# Patient Record
Sex: FEMALE | Race: BLACK OR AFRICAN AMERICAN | NOT HISPANIC OR LATINO | Employment: FULL TIME | URBAN - METROPOLITAN AREA
[De-identification: names, ages, dates, MRNs, and addresses within clinical notes are randomized per-mention and may not be internally consistent; named-entity substitution may affect disease eponyms.]

---

## 2017-02-18 ENCOUNTER — HOSPITAL ENCOUNTER (INPATIENT)
Facility: HOSPITAL | Age: 42
LOS: 1 days | Discharge: HOME | DRG: 638 | End: 2017-02-19
Attending: EMERGENCY MEDICINE | Admitting: INTERNAL MEDICINE
Payer: MEDICAID

## 2017-02-18 DIAGNOSIS — R53.1 WEAKNESS: ICD-10-CM

## 2017-02-18 DIAGNOSIS — N39.0 URINARY TRACT INFECTION WITHOUT HEMATURIA, SITE UNSPECIFIED: ICD-10-CM

## 2017-02-18 DIAGNOSIS — R73.9 HYPERGLYCEMIA: Primary | ICD-10-CM

## 2017-02-18 DIAGNOSIS — N17.9 ACUTE RENAL FAILURE, UNSPECIFIED ACUTE RENAL FAILURE TYPE: ICD-10-CM

## 2017-02-18 PROBLEM — R42 DIZZINESS: Status: ACTIVE | Noted: 2017-02-18

## 2017-02-18 PROBLEM — R11.2 NAUSEA WITH VOMITING: Status: ACTIVE | Noted: 2017-02-18

## 2017-02-18 PROBLEM — E66.9 OBESITY (BMI 30-39.9): Status: ACTIVE | Noted: 2017-02-18

## 2017-02-18 LAB
ALBUMIN SERPL BCP-MCNC: 3.9 G/DL
ALP SERPL-CCNC: 113 U/L
ALT SERPL W/O P-5'-P-CCNC: 15 U/L
ANION GAP SERPL CALC-SCNC: 14 MMOL/L
AST SERPL-CCNC: 14 U/L
B-HCG UR QL: NEGATIVE
B-OH-BUTYR BLD STRIP-SCNC: 0.2 MMOL/L
BACTERIA #/AREA URNS HPF: ABNORMAL /HPF
BASOPHILS # BLD AUTO: 0.05 K/UL
BASOPHILS NFR BLD: 0.4 %
BILIRUB SERPL-MCNC: 0.8 MG/DL
BILIRUB UR QL STRIP: NEGATIVE
BUN SERPL-MCNC: 20 MG/DL
CALCIUM SERPL-MCNC: 10.2 MG/DL
CHLORIDE SERPL-SCNC: 98 MMOL/L
CLARITY UR: ABNORMAL
CO2 SERPL-SCNC: 24 MMOL/L
COLOR UR: YELLOW
CREAT SERPL-MCNC: 2.4 MG/DL
CTP QC/QA: YES
DIFFERENTIAL METHOD: ABNORMAL
EOSINOPHIL # BLD AUTO: 0 K/UL
EOSINOPHIL NFR BLD: 0.3 %
ERYTHROCYTE [DISTWIDTH] IN BLOOD BY AUTOMATED COUNT: 13.1 %
EST. GFR  (AFRICAN AMERICAN): 28 ML/MIN/1.73 M^2
EST. GFR  (NON AFRICAN AMERICAN): 24 ML/MIN/1.73 M^2
GLUCOSE SERPL-MCNC: 443 MG/DL
GLUCOSE UR QL STRIP: ABNORMAL
HCT VFR BLD AUTO: 43.8 %
HGB BLD-MCNC: 15 G/DL
HGB UR QL STRIP: NEGATIVE
HYALINE CASTS #/AREA URNS LPF: 10 /LPF
KETONES UR QL STRIP: NEGATIVE
LEUKOCYTE ESTERASE UR QL STRIP: ABNORMAL
LYMPHOCYTES # BLD AUTO: 2.7 K/UL
LYMPHOCYTES NFR BLD: 24.1 %
MCH RBC QN AUTO: 28.8 PG
MCHC RBC AUTO-ENTMCNC: 34.2 %
MCV RBC AUTO: 84 FL
MICROSCOPIC COMMENT: ABNORMAL
MONOCYTES # BLD AUTO: 0.6 K/UL
MONOCYTES NFR BLD: 5.2 %
NEUTROPHILS # BLD AUTO: 7.8 K/UL
NEUTROPHILS NFR BLD: 69.7 %
NITRITE UR QL STRIP: NEGATIVE
PH UR STRIP: 5 [PH] (ref 5–8)
PLATELET # BLD AUTO: 316 K/UL
PMV BLD AUTO: 10.8 FL
POCT GLUCOSE: 195 MG/DL (ref 70–110)
POCT GLUCOSE: 236 MG/DL (ref 70–110)
POCT GLUCOSE: 291 MG/DL (ref 70–110)
POCT GLUCOSE: 324 MG/DL (ref 70–110)
POCT GLUCOSE: 327 MG/DL (ref 70–110)
POCT GLUCOSE: 377 MG/DL (ref 70–110)
POCT GLUCOSE: 384 MG/DL (ref 70–110)
POTASSIUM SERPL-SCNC: 3.9 MMOL/L
PROT SERPL-MCNC: 8.7 G/DL
PROT UR QL STRIP: NEGATIVE
RBC # BLD AUTO: 5.21 M/UL
RBC #/AREA URNS HPF: 1 /HPF (ref 0–4)
SODIUM SERPL-SCNC: 136 MMOL/L
SP GR UR STRIP: 1.01 (ref 1–1.03)
SQUAMOUS #/AREA URNS HPF: 15 /HPF
URN SPEC COLLECT METH UR: ABNORMAL
UROBILINOGEN UR STRIP-ACNC: NEGATIVE EU/DL
WBC # BLD AUTO: 11.24 K/UL
WBC #/AREA URNS HPF: 15 /HPF (ref 0–5)
YEAST URNS QL MICRO: ABNORMAL

## 2017-02-18 PROCEDURE — 81000 URINALYSIS NONAUTO W/SCOPE: CPT

## 2017-02-18 PROCEDURE — 82010 KETONE BODYS QUAN: CPT

## 2017-02-18 PROCEDURE — 11000001 HC ACUTE MED/SURG PRIVATE ROOM

## 2017-02-18 PROCEDURE — 85025 COMPLETE CBC W/AUTO DIFF WBC: CPT

## 2017-02-18 PROCEDURE — 63600175 PHARM REV CODE 636 W HCPCS: Performed by: EMERGENCY MEDICINE

## 2017-02-18 PROCEDURE — 82962 GLUCOSE BLOOD TEST: CPT

## 2017-02-18 PROCEDURE — 81025 URINE PREGNANCY TEST: CPT | Performed by: EMERGENCY MEDICINE

## 2017-02-18 PROCEDURE — 99285 EMERGENCY DEPT VISIT HI MDM: CPT

## 2017-02-18 PROCEDURE — 87086 URINE CULTURE/COLONY COUNT: CPT

## 2017-02-18 PROCEDURE — 96372 THER/PROPH/DIAG INJ SC/IM: CPT

## 2017-02-18 PROCEDURE — 25000003 PHARM REV CODE 250: Performed by: EMERGENCY MEDICINE

## 2017-02-18 PROCEDURE — 96367 TX/PROPH/DG ADDL SEQ IV INF: CPT

## 2017-02-18 PROCEDURE — 96365 THER/PROPH/DIAG IV INF INIT: CPT

## 2017-02-18 PROCEDURE — 83036 HEMOGLOBIN GLYCOSYLATED A1C: CPT

## 2017-02-18 PROCEDURE — 36415 COLL VENOUS BLD VENIPUNCTURE: CPT

## 2017-02-18 PROCEDURE — 25000003 PHARM REV CODE 250: Performed by: INTERNAL MEDICINE

## 2017-02-18 PROCEDURE — 80053 COMPREHEN METABOLIC PANEL: CPT

## 2017-02-18 RX ORDER — SODIUM CHLORIDE 9 MG/ML
INJECTION, SOLUTION INTRAVENOUS CONTINUOUS
Status: DISCONTINUED | OUTPATIENT
Start: 2017-02-18 | End: 2017-02-19 | Stop reason: HOSPADM

## 2017-02-18 RX ORDER — INSULIN ASPART 100 [IU]/ML
10 INJECTION, SOLUTION INTRAVENOUS; SUBCUTANEOUS
Status: COMPLETED | OUTPATIENT
Start: 2017-02-18 | End: 2017-02-18

## 2017-02-18 RX ORDER — SODIUM CHLORIDE 9 MG/ML
1000 INJECTION, SOLUTION INTRAVENOUS ONCE
Status: DISCONTINUED | OUTPATIENT
Start: 2017-02-18 | End: 2017-02-19 | Stop reason: HOSPADM

## 2017-02-18 RX ORDER — ACETAMINOPHEN 325 MG/1
650 TABLET ORAL EVERY 6 HOURS PRN
Status: DISCONTINUED | OUTPATIENT
Start: 2017-02-18 | End: 2017-02-19 | Stop reason: HOSPADM

## 2017-02-18 RX ORDER — AMOXICILLIN 250 MG
1 CAPSULE ORAL 2 TIMES DAILY
Status: DISCONTINUED | OUTPATIENT
Start: 2017-02-18 | End: 2017-02-19 | Stop reason: HOSPADM

## 2017-02-18 RX ORDER — ONDANSETRON 2 MG/ML
4 INJECTION INTRAMUSCULAR; INTRAVENOUS EVERY 12 HOURS PRN
Status: DISCONTINUED | OUTPATIENT
Start: 2017-02-18 | End: 2017-02-19 | Stop reason: HOSPADM

## 2017-02-18 RX ORDER — INSULIN GLARGINE 100 [IU]/ML
INJECTION, SOLUTION SUBCUTANEOUS NIGHTLY
Status: ON HOLD | COMMUNITY
End: 2020-11-16 | Stop reason: HOSPADM

## 2017-02-18 RX ORDER — INSULIN ASPART 100 [IU]/ML
1-12 INJECTION, SOLUTION INTRAVENOUS; SUBCUTANEOUS
Status: DISCONTINUED | OUTPATIENT
Start: 2017-02-18 | End: 2017-02-19 | Stop reason: HOSPADM

## 2017-02-18 RX ORDER — HEPARIN SODIUM 5000 [USP'U]/ML
5000 INJECTION, SOLUTION INTRAVENOUS; SUBCUTANEOUS EVERY 8 HOURS
Status: DISCONTINUED | OUTPATIENT
Start: 2017-02-18 | End: 2017-02-19 | Stop reason: HOSPADM

## 2017-02-18 RX ORDER — INSULIN ASPART 100 [IU]/ML
1-12 INJECTION, SOLUTION INTRAVENOUS; SUBCUTANEOUS
Status: DISCONTINUED | OUTPATIENT
Start: 2017-02-18 | End: 2017-02-18

## 2017-02-18 RX ORDER — GLUCAGON 1 MG
1 KIT INJECTION
Status: DISCONTINUED | OUTPATIENT
Start: 2017-02-18 | End: 2017-02-19 | Stop reason: HOSPADM

## 2017-02-18 RX ADMIN — INSULIN ASPART 2 UNITS: 100 INJECTION, SOLUTION INTRAVENOUS; SUBCUTANEOUS at 12:02

## 2017-02-18 RX ADMIN — INSULIN ASPART 10 UNITS: 100 INJECTION, SOLUTION INTRAVENOUS; SUBCUTANEOUS at 04:02

## 2017-02-18 RX ADMIN — CEFTRIAXONE 1 G: 1 INJECTION, SOLUTION INTRAVENOUS at 05:02

## 2017-02-18 RX ADMIN — SODIUM CHLORIDE 1000 ML: 0.9 INJECTION, SOLUTION INTRAVENOUS at 01:02

## 2017-02-18 RX ADMIN — INSULIN ASPART 4 UNITS: 100 INJECTION, SOLUTION INTRAVENOUS; SUBCUTANEOUS at 08:02

## 2017-02-18 RX ADMIN — ONDANSETRON 4 MG: 2 INJECTION INTRAMUSCULAR; INTRAVENOUS at 10:02

## 2017-02-18 RX ADMIN — PROMETHAZINE HYDROCHLORIDE 25 MG: 25 INJECTION, SOLUTION INTRAMUSCULAR; INTRAVENOUS at 01:02

## 2017-02-18 RX ADMIN — INSULIN ASPART 6 UNITS: 100 INJECTION, SOLUTION INTRAVENOUS; SUBCUTANEOUS at 05:02

## 2017-02-18 RX ADMIN — INSULIN ASPART 10 UNITS: 100 INJECTION, SOLUTION INTRAVENOUS; SUBCUTANEOUS at 09:02

## 2017-02-18 RX ADMIN — HEPARIN SODIUM 5000 UNITS: 5000 INJECTION, SOLUTION INTRAVENOUS; SUBCUTANEOUS at 02:02

## 2017-02-18 RX ADMIN — HEPARIN SODIUM 5000 UNITS: 5000 INJECTION, SOLUTION INTRAVENOUS; SUBCUTANEOUS at 09:02

## 2017-02-18 RX ADMIN — SODIUM CHLORIDE: 0.9 INJECTION, SOLUTION INTRAVENOUS at 08:02

## 2017-02-18 RX ADMIN — DOCUSATE SODIUM AND SENNOSIDES 1 TABLET: 8.6; 5 TABLET, FILM COATED ORAL at 08:02

## 2017-02-18 NOTE — ASSESSMENT & PLAN NOTE
In this patient with insulin requiring DM II. The patient was found to have a UTI likely as culprit. Will continue home insulin regimen and sliding scale.

## 2017-02-18 NOTE — ASSESSMENT & PLAN NOTE
Ceftriaxone for now. Likely culprit for N/V, hyperglycemia with acute renal failure. No sepsis.

## 2017-02-18 NOTE — IP AVS SNAPSHOT
[place] [address] [place] [address]  Phone: [phone]           Patient Discharge Instructions     Our goal is to set you up for success. This packet includes information on your condition, medications, and your home care. It will help you to care for yourself so you don't get sicker and need to go back to the hospital.     Please ask your nurse if you have any questions.        There are many details to remember when preparing to leave the hospital. Here is what you will need to do:    1. Take your medicine. If you are prescribed medications, review your Medication List in the following pages. You may have new medications to  at the pharmacy and others that you'll need to stop taking. Review the instructions for how and when to take your medications. Talk with your doctor or nurses if you are unsure of what to do.     2. Go to your follow-up appointments. Specific follow-up information is listed in the following pages. Your may be contacted by a transition nurse or clinical provider about future appointments. Be sure we have all of the phone numbers to reach you, if needed. Please contact your provider's office if you are unable to make an appointment.     3. Watch for warning signs. Your doctor or nurse will give you detailed warning signs to watch for and when to call for assistance. These instructions may also include educational information about your condition. If you experience any of warning signs to your health, call your doctor.               [facility]  Unless otherwise directed by your provider, please contact [facility], our nurse care line that is available for 24/7 assistance.     [phone] (toll-free)    Registered nurses in the [facility] provide clinical advisement, health education, appointment booking, and other advisory services.                    ** Verify the list of medication(s) below is accurate and up to date.  Carry this with you in case of emergency. If your medications have changed, please notify your healthcare provider.             Medication List      START taking these medications        Additional Info                      ciprofloxacin HCl 500 MG tablet   Commonly known as:  CIPRO   Quantity:  10 tablet   Refills:  0   Dose:  500 mg    Instructions:  Take 1 tablet (500 mg total) by mouth every 12 (twelve) hours.     Begin Date    AM    Noon    PM    Bedtime         CONTINUE taking these medications        Additional Info                      insulin glargine 100 unit/mL injection   Commonly known as:  LANTUS   Refills:  0    Instructions:  Inject into the skin every evening.     Begin Date    AM    Noon    PM    Bedtime       insulin NPH-insulin regular (70/30) 100 unit/mL (70-30) injection   Commonly known as:  NOVOLIN 70/30   Refills:  0    Instructions:  Inject into the skin 2 (two) times daily.     Begin Date    AM    Noon    PM    Bedtime       insulin regular 100 unit/mL injection   Commonly known as:  Humulin R   Quantity:  3 mL   Refills:  1   Dose:  1 Units   Comments:  As per Sliding Scale instructions    Instructions:  Inject 1 Units into the skin 3 (three) times daily before meals.     Begin Date    AM    Noon    PM    Bedtime       promethazine 25 MG tablet   Commonly known as:  PHENERGAN   Quantity:  15 tablet   Refills:  0   Dose:  25 mg    Instructions:  Take 1 tablet (25 mg total) by mouth every 6 (six) hours as needed for Nausea.     Begin Date    AM    Noon    PM    Bedtime            Where to Get Your Medications      You can get these medications from any pharmacy     Bring a paper prescription for each of these medications     ciprofloxacin HCl 500 MG tablet                  Please bring to all follow up appointments:    1. A copy of your discharge instructions.  2. All medicines you are currently taking in their original bottles.  3. Identification and insurance card.    Please arrive 15  "minutes ahead of scheduled appointment time.    Please call 24 hours in advance if you must reschedule your appointment and/or time.        Follow-up Information     Follow up with Primary Doctor No In 1 week.          Primary Diagnosis     Your primary diagnosis was:  Elevated Blood Sugar Level      Admission Information     Date & Time Provider Department CSN    2/18/2017 12:50 AM Agusto Colon MD [facility] [id number]      Care Providers     Provider Role Specialty Primary office phone    Agusto Colon MD Attending Provider Hospitalist [phone]      Your Vitals Were     BP Pulse Temp Resp Height Weight    133/80 83 98.3 °F (36.8 °C) (Oral) 18 5' 7" (1.702 m) 113.4 kg (250 lb)    SpO2 BMI             97% 39.16 kg/m2         Recent Lab Values     No lab values to display.      Pending Labs     Order Current Status    Hemoglobin A1c In process    Urine culture **CANNOT BE ORDERED STAT** Preliminary result      Allergies as of 2/19/2017     No Known Allergies      Advance Directives     An advance directive is a document which, in the event you are no longer able to make decisions for yourself, tells your healthcare team what kind of treatment you do or do not want to receive, or who you would like to make those decisions for you.  If you do not currently have an advance directive, [facility] encourages you to create one.  For more information call:  [phone]), [phone] ([phone]),  or log on to [email or web address].        Smoking Cessation     If you would like to quit smoking:   You may be eligible for free services if you are a [place] resident and started smoking cigarettes before September 1, 1988.  Call the [employer]oking [employer]essation [employer] ([employer]) toll free at (253) 091-8632 or (847) 558-3492.   Call 6-800QUIT-NOW if you do not meet the above criteria.            Language Assistance Services     ATTENTION: Language assistance services are available, free " of charge. Please call [phone].      ATENCIÓN: Si drake keenan, tiene a mcgovern disposición servicios gratuitos de asistencia lingüística. Llame al [phone].     CHÚ Ý: N?u b?n nói Ti?ng Vi?t, có các d?ch v? h? tr? ngôn ng? mi?n phí dành cho b?n. G?i s? [phone].        Diabetes Discharge Instructions                                   [facility] Sign-Up     Activating your [facility] account is as easy as 1-2-3!     1) Visit [email or web address], select Sign Up Now, enter this activation code and your date of birth, then select Next.  [email or web address]  Expires: 4/5/2017 11:32 AM      2) Create a username and password to use when you visit [facility] in the future and select a security question in case you lose your password and select Next.    3) Enter your e-mail address and click Sign Up!    Additional Information  If you have questions, please e-mail [email or web address] or call [phone] to talk to our [facility] staff. Remember, [facility] is NOT to be used for urgent needs. For medical emergencies, dial 911.          [facility] complies with applicable Federal civil rights laws and does not discriminate on the basis of race, color, national origin, age, disability, or sex.

## 2017-02-18 NOTE — H&P
"[facility] Hospital Medicine  History & Physical    Patient Name: Rebecca Olvera  MRN: [id number]  Admission Date: 2/18/2017  Attending Physician: Agusto Colon MD   Primary Care Provider: Primary Doctor No         Patient information was obtained from patient and ER records.     Subjective:     Principal Problem:Hyperglycemia/acute renal failure     Chief Complaint:   Chief Complaint   Patient presents with    Hyperglycemia     "I was at work and I started feeling nauseous and my head was spinning, the nurse checked my blood sugar and it was 400 something."         HPI: Mrs. Olvera is a 42 yo F with a History of DM II- insulin requiring who presents to the ED last night with a CC of N/V and lightheadedness. She reports a glucose at home of 489. The patient was evaluated in the ED and was found to have elevated blood glucose levels with a UTI as well as acute renal failure. The patient stated that she had no UTI symptoms including burning or fever.  She states that she does not have any renal issues. Her CRT was normal 6/15. She has been started on Abx and IV fluids. She states she feels much better. She notes that normally her blood sugars are well controlled.   She is resting comfortably in the room and has no complaints.      Past Medical History   Diagnosis Date    Diabetes mellitus        History reviewed. No pertinent past surgical history.    Review of patient's allergies indicates:  No Known Allergies    No current facility-administered medications on file prior to encounter.      Current Outpatient Prescriptions on File Prior to Encounter   Medication Sig    insulin NPH-insulin regular, 70/30, (NOVOLIN 70/30) 100 unit/mL (70-30) injection Inject into the skin 2 (two) times daily.    insulin regular 100 unit/mL Inj injection Inject 1 Units into the skin 3 (three) times daily before meals.    promethazine (PHENERGAN) 25 MG tablet Take 1 tablet (25 mg total) by mouth every 6 (six) " hours as needed for Nausea.     Family History     None        Social History Main Topics    Smoking status: Former Smoker    Smokeless tobacco: Not on file    Alcohol use No    Drug use: No    Sexual activity: Not Currently     Review of Systems   Constitutional: Negative for activity change, appetite change, chills, diaphoresis, fatigue, fever and unexpected weight change.   HENT: Negative for congestion.    Respiratory: Negative for chest tightness, shortness of breath and wheezing.    Cardiovascular: Negative for chest pain, palpitations and leg swelling.   Gastrointestinal: Positive for nausea and vomiting. Negative for abdominal distention, abdominal pain and diarrhea.   Endocrine: Negative for cold intolerance.   Genitourinary: Negative for difficulty urinating, dysuria, flank pain and frequency.   Musculoskeletal: Negative for arthralgias.   Neurological: Positive for dizziness and light-headedness.     Objective:     Vital Signs (Most Recent):  Temp: 98.1 °F (36.7 °C) (02/18/17 0646)  Pulse: 94 (02/18/17 0646)  Resp: 19 (02/18/17 0646)  BP: 113/71 (02/18/17 0646)  SpO2: 99 % (02/18/17 0646) Vital Signs (24h Range):  Temp:  [97.8 °F (36.6 °C)-98.1 °F (36.7 °C)] 98.1 °F (36.7 °C)  Pulse:  [] 94  Resp:  [18-19] 19  SpO2:  [95 %-100 %] 99 %  BP: (100-129)/(61-71) 113/71     Weight: 113.4 kg (250 lb)  Body mass index is 39.16 kg/(m^2).    Physical Exam   Constitutional: She is oriented to person, place, and time. She appears well-developed and well-nourished.   HENT:   Head: Normocephalic and atraumatic.   Cardiovascular: Normal rate and normal heart sounds.  Exam reveals no gallop and no friction rub.    Pulmonary/Chest: Effort normal and breath sounds normal. No respiratory distress. She has no wheezes.   Abdominal: Soft. She exhibits no distension. There is no tenderness.   Neurological: She is alert and oriented to person, place, and time.   Skin: Skin is warm and dry.   Psychiatric: She has a  normal mood and affect. Her behavior is normal.   Vitals reviewed.       Significant Labs:   CBC:   Recent Labs  Lab 02/18/17  0125   WBC 11.24   HGB 15.0   HCT 43.8        CMP:   Recent Labs  Lab 02/18/17  0125      K 3.9   CL 98   CO2 24   *   BUN 20   CREATININE 2.4*   CALCIUM 10.2   PROT 8.7*   ALBUMIN 3.9   BILITOT 0.8   ALKPHOS 113   AST 14   ALT 15   ANIONGAP 14   EGFRNONAA 24*       Significant Imaging:     Assessment/Plan:     * Hyperglycemia  In this patient with insulin requiring DM II. The patient was found to have a UTI likely as culprit. Will continue home insulin regimen and sliding scale.        Acute renal failure  Normal renal function at baseline. All pre-renal. No need for nephrology consult. IV fluids- aggressive. BMP daily.  Secondary to hyperosmolar state and N/V.       Diabetes mellitus type II, uncontrolled  Will continue home insulin. Check an A1c.        Obesity (BMI 30-39.9)  Body mass index is 39.16 kg/(m^2).  Weight loss as out patient       Dizziness  Has resolved with IV fluids. Likely from dehydration/acute renal failure       Urinary tract infection  Ceftriaxone for now. Likely culprit for N/V, hyperglycemia with acute renal failure. No sepsis.       Nausea with vomiting  Has resolved.  Likely from UTI/acute renal failure       VTE Risk Mitigation         Ordered     heparin (porcine) injection 5,000 Units  Every 8 hours     Route:  Subcutaneous        02/18/17 0617     Medium Risk of VTE  Once      02/18/17 0617     Place SUBHASH hose  Until discontinued      02/18/17 0617        Continue IV fluids and Abx. BMP in am  Home in 1-2 days likely.   A1c pending.   Agusto Thornton MD  Department of Hospital Medicine   [facility]

## 2017-02-18 NOTE — ASSESSMENT & PLAN NOTE
Normal renal function at baseline. All pre-renal. No need for nephrology consult. IV fluids- aggressive. BMP daily.  Secondary to hyperosmolar state and N/V.

## 2017-02-18 NOTE — SUBJECTIVE & OBJECTIVE
Past Medical History   Diagnosis Date    Diabetes mellitus        History reviewed. No pertinent past surgical history.    Review of patient's allergies indicates:  No Known Allergies    No current facility-administered medications on file prior to encounter.      Current Outpatient Prescriptions on File Prior to Encounter   Medication Sig    insulin NPH-insulin regular, 70/30, (NOVOLIN 70/30) 100 unit/mL (70-30) injection Inject into the skin 2 (two) times daily.    insulin regular 100 unit/mL Inj injection Inject 1 Units into the skin 3 (three) times daily before meals.    promethazine (PHENERGAN) 25 MG tablet Take 1 tablet (25 mg total) by mouth every 6 (six) hours as needed for Nausea.     Family History     None        Social History Main Topics    Smoking status: Former Smoker    Smokeless tobacco: Not on file    Alcohol use No    Drug use: No    Sexual activity: Not Currently     Review of Systems   Constitutional: Negative for activity change, appetite change, chills, diaphoresis, fatigue, fever and unexpected weight change.   HENT: Negative for congestion.    Respiratory: Negative for chest tightness, shortness of breath and wheezing.    Cardiovascular: Negative for chest pain, palpitations and leg swelling.   Gastrointestinal: Positive for nausea and vomiting. Negative for abdominal distention, abdominal pain and diarrhea.   Endocrine: Negative for cold intolerance.   Genitourinary: Negative for difficulty urinating, dysuria, flank pain and frequency.   Musculoskeletal: Negative for arthralgias.   Neurological: Positive for dizziness and light-headedness.     Objective:     Vital Signs (Most Recent):  Temp: 98.1 °F (36.7 °C) (02/18/17 0646)  Pulse: 94 (02/18/17 0646)  Resp: 19 (02/18/17 0646)  BP: 113/71 (02/18/17 0646)  SpO2: 99 % (02/18/17 0646) Vital Signs (24h Range):  Temp:  [97.8 °F (36.6 °C)-98.1 °F (36.7 °C)] 98.1 °F (36.7 °C)  Pulse:  [] 94  Resp:  [18-19] 19  SpO2:  [95 %-100 %] 99  %  BP: (100-129)/(61-71) 113/71     Weight: 113.4 kg (250 lb)  Body mass index is 39.16 kg/(m^2).    Physical Exam   Constitutional: She is oriented to person, place, and time. She appears well-developed and well-nourished.   HENT:   Head: Normocephalic and atraumatic.   Cardiovascular: Normal rate and normal heart sounds.  Exam reveals no gallop and no friction rub.    Pulmonary/Chest: Effort normal and breath sounds normal. No respiratory distress. She has no wheezes.   Abdominal: Soft. She exhibits no distension. There is no tenderness.   Neurological: She is alert and oriented to person, place, and time.   Skin: Skin is warm and dry.   Psychiatric: She has a normal mood and affect. Her behavior is normal.   Vitals reviewed.       Significant Labs:   CBC:   Recent Labs  Lab 02/18/17  0125   WBC 11.24   HGB 15.0   HCT 43.8        CMP:   Recent Labs  Lab 02/18/17  0125      K 3.9   CL 98   CO2 24   *   BUN 20   CREATININE 2.4*   CALCIUM 10.2   PROT 8.7*   ALBUMIN 3.9   BILITOT 0.8   ALKPHOS 113   AST 14   ALT 15   ANIONGAP 14   EGFRNONAA 24*       Significant Imaging:

## 2017-02-18 NOTE — PLAN OF CARE
02/18/17 1451   Discharge Assessment   Assessment Type Discharge Planning Assessment   Confirmed/corrected address and phone number on facesheet? Yes   Assessment information obtained from? Patient   Communicated expected length of stay with patient/caregiver no   Type of Healthcare Directive Received (Patient does not have Advanced Directives)   Prior to hospitilization cognitive status: Alert/Oriented   Prior to hospitalization functional status: Independent   Current cognitive status: Alert/Oriented   Current Functional Status: Independent   Arrived From home or self-care   Lives With significant other   Able to Return to Prior Arrangements yes   Is patient able to care for self after discharge? Yes   How many people do you have in your home that can help with your care after discharge? 1   Who are your caregiver(s) and their phone number(s)? Ho [place] [phone]   Patient's perception of discharge disposition home or selfcare   Readmission Within The Last 30 Days no previous admission in last 30 days   Patient currently being followed by outpatient case management? No   Patient currently receives home health services? No   Does the patient currently use HME? Yes   Patient currently receives private duty nursing? No   Patient currently receives any other outside agency services? No   Equipment Currently Used at Home glucometer   Do you have any problems affording any of your prescribed medications? No   Is the patient taking medications as prescribed? yes   Do you have any financial concerns preventing you from receiving the healthcare you need? No   Does the patient have transportation to healthcare appointments? Yes   Transportation Available family or friend will provide   On Dialysis? No   Does the patient receive services at the Coumadin Clinic? No   Are there any open cases? No   Discharge Plan A Home with family   Discharge Plan B Home with family   Patient/Family In Agreement With Plan  yes

## 2017-02-18 NOTE — ED TRIAGE NOTES
Pt presents to ED with c/o hyperglycemia. Pt reports since 2100 she began feeling nauseous, dizzy and having a headache. She reports hx of DM and took her blood sugar and it was 480s. She reports taking 10 units of regular insulin at 0020. Pt reports taking zofran with no relief. No distress is noted. Will continue to be monitored.

## 2017-02-18 NOTE — ED PROVIDER NOTES
"Encounter Date: 2/18/2017    SCRIBE #1 NOTE: I, Yaquelin Hilario, am scribing for, and in the presence of,  Humberto Rivas MD. I have scribed the following portions of the note - Other sections scribed: HPI, ROS.       History     Chief Complaint   Patient presents with    Hyperglycemia     "I was at work and I started feeling nauseous and my head was spinning, the nurse checked my blood sugar and it was 400 something."      Review of patient's allergies indicates:  No Known Allergies  HPI Comments: CC: Hyperglycemia    HPI: 41 year old female with DM presents to the ED c/o elevated blood sugar with associated symptoms of light-headedness, headaches, nausea, vomiting, epigastric abdominal pain, and "foggy" vision x 4 hours ago. Symptoms are acute onset and moderate (7/10). Pt states CBG was 489. She had 10 units of insulin an hour ago. Pt denies being hospitalized for diabetes. She denies fever, chills, cough, SOB, chest pain, urinary symptoms, back pain, and any other associated symptoms. No alleviating factors.    The history is provided by the patient. No  was used.     Past Medical History   Diagnosis Date    Diabetes mellitus      Past Medical History Pertinent Negatives   Diagnosis Date Noted    Asthma 6/17/2015    COPD (chronic obstructive pulmonary disease) 6/17/2015    Hypertension 6/17/2015     History reviewed. No pertinent past surgical history.  History reviewed. No pertinent family history.  Social History   Substance Use Topics    Smoking status: Former Smoker    Smokeless tobacco: None    Alcohol use No     Review of Systems   Constitutional: Negative for chills, diaphoresis and fever.   HENT: Negative for ear pain and sore throat.    Eyes: Positive for visual disturbance. Negative for redness.   Respiratory: Negative for cough and shortness of breath.    Cardiovascular: Negative for chest pain.   Gastrointestinal: Positive for abdominal pain (epigastric), nausea and " vomiting. Negative for diarrhea.   Genitourinary: Negative for dysuria.   Musculoskeletal: Negative for back pain.   Skin: Negative for rash.   Neurological: Positive for light-headedness and headaches.       Physical Exam   Initial Vitals   BP Pulse Resp Temp SpO2   02/18/17 0046 02/18/17 0046 02/18/17 0046 02/18/17 0046 02/18/17 0046   100/63 102 18 97.8 °F (36.6 °C) 95 %     Physical Exam    Nursing note and vitals reviewed.  Constitutional: She appears well-developed and well-nourished. She is not diaphoretic. No distress.   Well-appearing female in no acute distress.   HENT:   Head: Normocephalic and atraumatic.   Right Ear: External ear normal.   Left Ear: External ear normal.   Nose: Nose normal.   Mouth/Throat: Oropharynx is clear and moist.   Eyes: Conjunctivae and EOM are normal. Pupils are equal, round, and reactive to light. Right eye exhibits no discharge. Left eye exhibits no discharge. No scleral icterus.   Neck: Normal range of motion. Neck supple.   Cardiovascular: Normal rate, regular rhythm, normal heart sounds and intact distal pulses.   No murmur heard.  Pulmonary/Chest: Breath sounds normal. No respiratory distress. She has no wheezes. She has no rhonchi. She has no rales.   Abdominal: Soft. Bowel sounds are normal. She exhibits no distension and no mass. There is no tenderness. There is no rebound and no guarding.   Musculoskeletal: Normal range of motion. She exhibits no edema or tenderness.   Neurological: She is alert and oriented to person, place, and time. She has normal strength. No cranial nerve deficit or sensory deficit.   Skin: Skin is warm and dry. No rash noted. No erythema. No pallor.   Psychiatric: She has a normal mood and affect. Her behavior is normal. Judgment and thought content normal.         ED Course   Procedures  Labs Reviewed   URINALYSIS - Abnormal; Notable for the following:        Result Value    Appearance, UA Cloudy (*)     Glucose, UA 2+ (*)     Leukocytes, UA 1+  (*)     All other components within normal limits   CBC W/ AUTO DIFFERENTIAL - Abnormal; Notable for the following:     Gran # 7.8 (*)     All other components within normal limits   COMPREHENSIVE METABOLIC PANEL - Abnormal; Notable for the following:     Glucose 443 (*)     Creatinine 2.4 (*)     Total Protein 8.7 (*)     eGFR if  28 (*)     eGFR if non  24 (*)     All other components within normal limits   URINALYSIS MICROSCOPIC - Abnormal; Notable for the following:     WBC, UA 15 (*)     Bacteria, UA Many (*)     Yeast, UA Moderate (*)     Hyaline Casts, UA 10 (*)     All other components within normal limits   POCT GLUCOSE - Abnormal; Notable for the following:     POCT Glucose 384 (*)     All other components within normal limits   POCT GLUCOSE - Abnormal; Notable for the following:     POCT Glucose 327 (*)     All other components within normal limits   CULTURE, URINE   BETA - HYDROXYBUTYRATE, SERUM   POCT URINE PREGNANCY   POCT GLUCOSE MONITORING CONTINUOUS     EKG Readings: (Independently Interpreted)   Initial Reading: No STEMI.   EKG reviewed and interpreted by me shows normal sinus rhythm at a rate of 100.  RI, QRS, QT intervals within normal limits.  There is a right axis deviation.  There is normal R-wave progression.  There are no ST segment or T-wave ischemic findings.          Medical Decision Making:   ED Management:  This is the emergent evaluation of a 41-year-old female presents the emergency department for nausea, lightheadedness, headache, hyperglycemia his prior to arrival.Differential diagnosis at the time of initial evaluation included, but was not limited to:  DKA,  hyperosmolar hyperglycemic state, dehydration, metabolic disturbance, infection.  Patient has hyperglycemia without evidence of ketoacidosis.  No evidence of ketosis based on urine.  There is evidence of possible urinary tract infection.  Patient does not appear septic.  However, she does have  what appears to be acute renal insufficiency.  I will treat this patient with IV fluids and consult nephrology.  I have put her on a sliding scale insulin protocol.  Case was discussed with the admitting hospitalist, Dr. Gr who agreed with the above plan.  Patient is stable at the time of admission.            Scribe Attestation:   Scribe #1: I performed the above scribed service and the documentation accurately describes the services I performed. I attest to the accuracy of the note.    Attending Attestation:           Physician Attestation for Scribe:  Physician Attestation Statement for Scribe #1: I, Humberto Rivas MD, reviewed documentation, as scribed by Yaquelin Rich in my presence, and it is both accurate and complete.                 ED Course     Clinical Impression:   The primary encounter diagnosis was Hyperglycemia. Diagnoses of Acute renal failure, unspecified acute renal failure type, Weakness, and Urinary tract infection without hematuria, site unspecified were also pertinent to this visit.          Humberto Rivas Jr., MD  02/18/17 1991

## 2017-02-18 NOTE — ED NOTES
Received report from Mikala RILEY. Pt is resting. No distress is noted. no Family at bedside. Call bell in reach, side rails are up x2, will continue to be monitored.

## 2017-02-18 NOTE — NURSING
Pt arrived safely on floor, VS WNL, no complaints of pain, settled in bed.  IVF started.  Will continue to monitor.

## 2017-02-19 VITALS
BODY MASS INDEX: 39.24 KG/M2 | HEART RATE: 83 BPM | DIASTOLIC BLOOD PRESSURE: 80 MMHG | RESPIRATION RATE: 18 BRPM | TEMPERATURE: 98 F | SYSTOLIC BLOOD PRESSURE: 133 MMHG | OXYGEN SATURATION: 97 % | HEIGHT: 67 IN | WEIGHT: 250 LBS

## 2017-02-19 PROBLEM — R11.2 NAUSEA WITH VOMITING: Status: RESOLVED | Noted: 2017-02-18 | Resolved: 2017-02-19

## 2017-02-19 PROBLEM — R42 DIZZINESS: Status: RESOLVED | Noted: 2017-02-18 | Resolved: 2017-02-19

## 2017-02-19 PROBLEM — N17.9 ACUTE RENAL FAILURE: Status: RESOLVED | Noted: 2017-02-18 | Resolved: 2017-02-19

## 2017-02-19 LAB
ANION GAP SERPL CALC-SCNC: 9 MMOL/L
BASOPHILS # BLD AUTO: 0.03 K/UL
BASOPHILS NFR BLD: 0.4 %
BUN SERPL-MCNC: 18 MG/DL
CALCIUM SERPL-MCNC: 8.5 MG/DL
CHLORIDE SERPL-SCNC: 104 MMOL/L
CO2 SERPL-SCNC: 26 MMOL/L
CREAT SERPL-MCNC: 1.2 MG/DL
DIFFERENTIAL METHOD: ABNORMAL
EOSINOPHIL # BLD AUTO: 0.1 K/UL
EOSINOPHIL NFR BLD: 1.6 %
ERYTHROCYTE [DISTWIDTH] IN BLOOD BY AUTOMATED COUNT: 12.8 %
EST. GFR  (AFRICAN AMERICAN): >60 ML/MIN/1.73 M^2
EST. GFR  (NON AFRICAN AMERICAN): 56 ML/MIN/1.73 M^2
ESTIMATED AVG GLUCOSE: 260 MG/DL
GLUCOSE SERPL-MCNC: 338 MG/DL
HBA1C MFR BLD HPLC: 10.7 %
HCT VFR BLD AUTO: 36.7 %
HGB BLD-MCNC: 12.4 G/DL
LYMPHOCYTES # BLD AUTO: 3.3 K/UL
LYMPHOCYTES NFR BLD: 47.1 %
MCH RBC QN AUTO: 29 PG
MCHC RBC AUTO-ENTMCNC: 33.8 %
MCV RBC AUTO: 86 FL
MONOCYTES # BLD AUTO: 0.5 K/UL
MONOCYTES NFR BLD: 6.9 %
NEUTROPHILS # BLD AUTO: 3.1 K/UL
NEUTROPHILS NFR BLD: 43.9 %
PLATELET # BLD AUTO: 284 K/UL
PMV BLD AUTO: 11.4 FL
POCT GLUCOSE: 307 MG/DL (ref 70–110)
POTASSIUM SERPL-SCNC: 4 MMOL/L
RBC # BLD AUTO: 4.28 M/UL
SODIUM SERPL-SCNC: 139 MMOL/L
WBC # BLD AUTO: 7 K/UL

## 2017-02-19 PROCEDURE — 36415 COLL VENOUS BLD VENIPUNCTURE: CPT

## 2017-02-19 PROCEDURE — 25000003 PHARM REV CODE 250: Performed by: EMERGENCY MEDICINE

## 2017-02-19 PROCEDURE — 80048 BASIC METABOLIC PNL TOTAL CA: CPT

## 2017-02-19 PROCEDURE — 85025 COMPLETE CBC W/AUTO DIFF WBC: CPT

## 2017-02-19 PROCEDURE — 63600175 PHARM REV CODE 636 W HCPCS: Performed by: EMERGENCY MEDICINE

## 2017-02-19 PROCEDURE — 25000003 PHARM REV CODE 250: Performed by: INTERNAL MEDICINE

## 2017-02-19 RX ORDER — CIPROFLOXACIN 500 MG/1
500 TABLET ORAL EVERY 12 HOURS
Qty: 10 TABLET | Refills: 0 | Status: SHIPPED | OUTPATIENT
Start: 2017-02-19 | End: 2017-02-24

## 2017-02-19 RX ORDER — GABAPENTIN 100 MG/1
100 CAPSULE ORAL 3 TIMES DAILY
Status: DISCONTINUED | OUTPATIENT
Start: 2017-02-19 | End: 2017-02-19 | Stop reason: HOSPADM

## 2017-02-19 RX ADMIN — GABAPENTIN 100 MG: 100 CAPSULE ORAL at 09:02

## 2017-02-19 RX ADMIN — CEFTRIAXONE 1 G: 1 INJECTION, SOLUTION INTRAVENOUS at 05:02

## 2017-02-19 RX ADMIN — DOCUSATE SODIUM AND SENNOSIDES 1 TABLET: 8.6; 5 TABLET, FILM COATED ORAL at 08:02

## 2017-02-19 RX ADMIN — HEPARIN SODIUM 5000 UNITS: 5000 INJECTION, SOLUTION INTRAVENOUS; SUBCUTANEOUS at 06:02

## 2017-02-19 RX ADMIN — INSULIN ASPART 8 UNITS: 100 INJECTION, SOLUTION INTRAVENOUS; SUBCUTANEOUS at 08:02

## 2017-02-19 NOTE — ASSESSMENT & PLAN NOTE
Ceftriaxone for now. Likely culprit for N/V, hyperglycemia with acute renal failure. No sepsis.  Will send home on 5 days of Cipro.

## 2017-02-19 NOTE — SUBJECTIVE & OBJECTIVE
Interval History:  No new issues. She states she feels great and would like to go home.     Review of Systems   Constitutional: Negative for activity change.   Respiratory: Negative for chest tightness and shortness of breath.    Cardiovascular: Negative for chest pain.   Gastrointestinal: Negative for abdominal pain.     Objective:     Vital Signs (Most Recent):  Temp: 98.3 °F (36.8 °C) (02/19/17 0820)  Pulse: 83 (02/19/17 0820)  Resp: 18 (02/19/17 0820)  BP: 133/80 (02/19/17 0820)  SpO2: 97 % (02/19/17 0820) Vital Signs (24h Range):  Temp:  [97.5 °F (36.4 °C)-98.5 °F (36.9 °C)] 98.3 °F (36.8 °C)  Pulse:  [83-93] 83  Resp:  [18-20] 18  SpO2:  [96 %-99 %] 97 %  BP: (108-143)/(57-80) 133/80     Weight: 113.4 kg (250 lb)  Body mass index is 39.16 kg/(m^2).    Intake/Output Summary (Last 24 hours) at 02/19/17 0937  Last data filed at 02/19/17 0600   Gross per 24 hour   Intake             1160 ml   Output             1725 ml   Net             -565 ml      Physical Exam   Constitutional: She is oriented to person, place, and time. She appears well-developed and well-nourished.   Cardiovascular: Normal rate.    Abdominal: Soft.   Neurological: She is alert and oriented to person, place, and time.   Vitals reviewed.      Significant Labs:   BMP:   Recent Labs  Lab 02/19/17 0318   *      K 4.0      CO2 26   BUN 18   CREATININE 1.2   CALCIUM 8.5*     CBC:   Recent Labs  Lab 02/18/17  0125 02/19/17 0318   WBC 11.24 7.00   HGB 15.0 12.4   HCT 43.8 36.7*    284       Significant Imaging:

## 2017-02-19 NOTE — PROGRESS NOTES
[facility] Hospital Medicine  Progress Note    Patient Name: Rebecca Olvera  MRN: [id number]  Patient Class: IP- Inpatient   Admission Date: 2/18/2017  Length of Stay: 1 days  Attending Physician: Agusto Colon MD  Primary Care Provider: Primary Doctor No        Subjective:     Principal Problem:Hyperglycemia    HPI:  Mrs. Olvera is a 42 yo F with a History of DM II- insulin requiring who presents to the ED last night with a CC of N/V and lightheadedness. She reports a glucose at home of 489. The patient was evaluated in the ED and was found to have elevated blood glucose levels with a UTI as well as acute renal failure. The patient stated that she had no UTI symptoms including burning or fever.  She states that she does not have any renal issues. Her CRT was normal 6/15. She has been started on Abx and IV fluids. She states she feels much better. She notes that normally her blood sugars are well controlled.   She is resting comfortably in the room and has no complaints.      Hospital Course:  The patient was admitted to the hospital for treatment of a UTI and acute renal failure from hyperglycemia.  The patient was started on IV fluids and by 2/20, the patient's renal failure had resolved.  The patient's urine culture is still pending. The patient tells me she has not had a lot of UTI's or been on multiple antibiotics. I offered her another day in the hospital but she would like to leave- reasonable.  I will send her home on Cipro for 5 more days. The patient understands to restart her insulin and increase lantus dose slowly if sugars are not controlled.  Her hyperglycemia was likely caused by her UTI. The rest of the hospital course was unremarkable.  The patient will be discharged to home today. Activity as tolerated. Follow up with PCP in one week.     Interval History:  No new issues. She states she feels great and would like to go home.     Review of Systems   Constitutional: Negative  for activity change.   Respiratory: Negative for chest tightness and shortness of breath.    Cardiovascular: Negative for chest pain.   Gastrointestinal: Negative for abdominal pain.     Objective:     Vital Signs (Most Recent):  Temp: 98.3 °F (36.8 °C) (02/19/17 0820)  Pulse: 83 (02/19/17 0820)  Resp: 18 (02/19/17 0820)  BP: 133/80 (02/19/17 0820)  SpO2: 97 % (02/19/17 0820) Vital Signs (24h Range):  Temp:  [97.5 °F (36.4 °C)-98.5 °F (36.9 °C)] 98.3 °F (36.8 °C)  Pulse:  [83-93] 83  Resp:  [18-20] 18  SpO2:  [96 %-99 %] 97 %  BP: (108-143)/(57-80) 133/80     Weight: 113.4 kg (250 lb)  Body mass index is 39.16 kg/(m^2).    Intake/Output Summary (Last 24 hours) at 02/19/17 0937  Last data filed at 02/19/17 0600   Gross per 24 hour   Intake             1160 ml   Output             1725 ml   Net             -565 ml      Physical Exam   Constitutional: She is oriented to person, place, and time. She appears well-developed and well-nourished.   Cardiovascular: Normal rate.    Abdominal: Soft.   Neurological: She is alert and oriented to person, place, and time.   Vitals reviewed.      Significant Labs:   BMP:   Recent Labs  Lab 02/19/17  0318   *      K 4.0      CO2 26   BUN 18   CREATININE 1.2   CALCIUM 8.5*     CBC:   Recent Labs  Lab 02/18/17  0125 02/19/17  0318   WBC 11.24 7.00   HGB 15.0 12.4   HCT 43.8 36.7*    284       Significant Imaging:    Assessment/Plan:      * Hyperglycemia  In this patient with insulin requiring DM II. The patient was found to have a UTI likely as culprit. Will continue home insulin regimen and sliding scale.        Acute renal failure  Normal renal function at baseline. All pre-renal. No need for nephrology consult. IV fluids- aggressive. BMP daily.  Secondary to hyperosmolar state and N/V.- resolved.       Diabetes mellitus type II, uncontrolled  Will continue home insulin. Check an A1c.        Obesity (BMI 30-39.9)  Body mass index is 39.16 kg/(m^2).  Weight  loss as out patient       Dizziness  Has resolved with IV fluids. Likely from dehydration/acute renal failure       Urinary tract infection  Ceftriaxone for now. Likely culprit for N/V, hyperglycemia with acute renal failure. No sepsis.  Will send home on 5 days of Cipro.       Nausea with vomiting  Has resolved.  Likely from UTI/acute renal failure- resolved       VTE Risk Mitigation         Ordered     heparin (porcine) injection 5,000 Units  Every 8 hours     Route:  Subcutaneous        02/18/17 0617     Medium Risk of VTE  Once      02/18/17 0617     Place SUBHASH jonase  Until discontinued      02/18/17 0617        Will d/c to home    Agusto Thornton MD  Department of Hospital Medicine   [facility]

## 2017-02-19 NOTE — PROGRESS NOTES
Pt IV d/c w/tip intact 2x2 & tape applied, pt given discharge f/u & prescription info, pt verbalized understanding and all questions addressed, pt awaiting ride home

## 2017-02-19 NOTE — PLAN OF CARE
Problem: Patient Care Overview  Goal: Plan of Care Review  Outcome: Ongoing (interventions implemented as appropriate)  Pt currently AAOx4, no falls no injuries.  Afebrile.  Blood sugar controlled by PRN insulin.  Nausea controlled by PRN zofran, no vomiting.  IVF currently running.  Will continue to monitor.

## 2017-02-19 NOTE — PLAN OF CARE
02/19/17 1138   Final Note   Assessment Type Final Discharge Note   Discharge Disposition Home   Discharge planning education complete? Yes   What phone number can be called within the next 1-3 days to see how you are doing after discharge? [phone]   Hospital Follow Up  Appt(s) scheduled? No   Discharge plans and expectations educations in teach back method with documentation complete? Yes   Offered [facility] -- Bedside Delivery? n/a   Discharge/Hospital Encounter Summary to (non-[facility]) PCP n/a   Referral to Outpatient Case Management complete? n/a   Referral to / orders for Home Health Complete? n/a   30 day supply of medicines given at discharge, if documented non-compliance / non-adherence? n/a   Any social issues identified prior to discharge? No   Did you assess the readiness or willingness of the family or caregiver to support self management of care? Yes   Right Care Referral Info   Post Acute Recommendation No Care

## 2017-02-19 NOTE — DISCHARGE SUMMARY
[facility] Hospital Medicine  Discharge Summary      Patient Name: Rebecca Olvera  MRN: [id number]  Admission Date: 2/18/2017  Hospital Length of Stay: 1 days  Discharge Date and Time:  02/19/2017 9:40 AM  Attending Physician: Agusto Colon MD   Discharging Provider: Agusto Colon MD  Primary Care Provider: Primary Doctor No      HPI:   Mrs. Olvera is a 42 yo F with a History of DM II- insulin requiring who presents to the ED last night with a CC of N/V and lightheadedness. She reports a glucose at home of 489. The patient was evaluated in the ED and was found to have elevated blood glucose levels with a UTI as well as acute renal failure. The patient stated that she had no UTI symptoms including burning or fever.  She states that she does not have any renal issues. Her CRT was normal 6/15. She has been started on Abx and IV fluids. She states she feels much better. She notes that normally her blood sugars are well controlled.   She is resting comfortably in the room and has no complaints.      * No surgery found *      Indwelling Lines/Drains at time of discharge:   Lines/Drains/Airways          No matching active lines, drains, or airways        Hospital Course:   The patient was admitted to the hospital for treatment of a UTI and acute renal failure from hyperglycemia.  The patient was started on IV fluids and by 2/20, the patient's renal failure had resolved.  The patient's urine culture is still pending. The patient tells me she has not had a lot of UTI's or been on multiple antibiotics. I offered her another day in the hospital but she would like to leave- reasonable.  I will send her home on Cipro for 5 more days. The patient understands to restart her insulin and increase lantus dose slowly if sugars are not controlled.  Her hyperglycemia was likely caused by her UTI. The rest of the hospital course was unremarkable.  The patient will be discharged to home today. Activity as  tolerated. Follow up with PCP in one week. ADA 1800 viviane diet      Consults:     Significant Diagnostic Studies:    Pending Diagnostic Studies:     Procedure Component Value Units Date/Time    Hemoglobin A1c [[id number]] Collected:  02/18/17 0125    Order Status:  Sent Lab Status:  In process Updated:  02/18/17 1408    Specimen:  Blood from Blood         Final Active Diagnoses:    Diagnosis Date Noted POA    PRINCIPAL PROBLEM:  Hyperglycemia [R73.9] 02/18/2017 Yes    Diabetes mellitus type II, uncontrolled [E11.65] 02/18/2017 Yes    Obesity (BMI 30-39.9) [E66.9] 02/18/2017 Yes    Urinary tract infection [N39.0] 02/18/2017 Yes      Problems Resolved During this Admission:    Diagnosis Date Noted Date Resolved POA    Acute renal failure [N17.9] 02/18/2017 02/19/2017 Yes    Dizziness [R42] 02/18/2017 02/19/2017 Yes    Nausea with vomiting [R11.2] 02/18/2017 02/19/2017 Yes      No new Assessment & Plan notes have been filed under this hospital service since the last note was generated.  Service: Hospital Medicine      Discharged Condition: good    Disposition:  to home     Follow Up:  Follow-up Information     Follow up with Primary Doctor No In 1 week.        Patient Instructions:   No discharge procedures on file.  Medications:  Reconciled Home Medications:   Current Discharge Medication List      START taking these medications    Details   ciprofloxacin HCl (CIPRO) 500 MG tablet Take 1 tablet (500 mg total) by mouth every 12 (twelve) hours.  Qty: 10 tablet, Refills: 0         CONTINUE these medications which have NOT CHANGED    Details   insulin glargine (LANTUS) 100 unit/mL injection Inject into the skin every evening.      insulin NPH-insulin regular, 70/30, (NOVOLIN 70/30) 100 unit/mL (70-30) injection Inject into the skin 2 (two) times daily.      insulin regular 100 unit/mL Inj injection Inject 1 Units into the skin 3 (three) times daily before meals.  Qty: 3 mL, Refills: 1    Comments: As per Sliding Scale  instructions      promethazine (PHENERGAN) 25 MG tablet Take 1 tablet (25 mg total) by mouth every 6 (six) hours as needed for Nausea.  Qty: 15 tablet, Refills: 0           Time spent on the discharge of patient: < 30 minutes    Agusto Thornton MD  Department of Hospital Medicine  [facility]

## 2017-02-19 NOTE — ASSESSMENT & PLAN NOTE
Normal renal function at baseline. All pre-renal. No need for nephrology consult. IV fluids- aggressive. BMP daily.  Secondary to hyperosmolar state and N/V.- resolved.

## 2017-02-20 LAB — BACTERIA UR CULT: NORMAL

## 2020-09-14 ENCOUNTER — OFFICE VISIT (OUTPATIENT)
Dept: PODIATRY | Facility: CLINIC | Age: 45
End: 2020-09-14
Payer: MEDICAID

## 2020-09-14 VITALS — DIASTOLIC BLOOD PRESSURE: 80 MMHG | SYSTOLIC BLOOD PRESSURE: 134 MMHG | HEART RATE: 88 BPM

## 2020-09-14 DIAGNOSIS — R22.42 LOCALIZED SWELLING OF LEFT FOOT: ICD-10-CM

## 2020-09-14 DIAGNOSIS — E13.610: ICD-10-CM

## 2020-09-14 DIAGNOSIS — S93.325A CLOSED DISLOCATION OF TARSOMETATARSAL JOINT OF LEFT FOOT, INITIAL ENCOUNTER: ICD-10-CM

## 2020-09-14 DIAGNOSIS — Z79.4: ICD-10-CM

## 2020-09-14 DIAGNOSIS — E08.43 DIABETIC AUTONOMIC NEUROPATHY ASSOCIATED WITH DIABETES MELLITUS DUE TO UNDERLYING CONDITION: ICD-10-CM

## 2020-09-14 DIAGNOSIS — S92.202A: ICD-10-CM

## 2020-09-14 DIAGNOSIS — E11.610 CHARCOT FOOT DUE TO DIABETES MELLITUS: Primary | ICD-10-CM

## 2020-09-14 DIAGNOSIS — S93.315A: ICD-10-CM

## 2020-09-14 PROBLEM — E11.618: Status: ACTIVE | Noted: 2020-09-14

## 2020-09-14 PROBLEM — S92.302A: Status: RESOLVED | Noted: 2020-09-14 | Resolved: 2020-09-14

## 2020-09-14 PROBLEM — S92.302A: Status: ACTIVE | Noted: 2020-09-14

## 2020-09-14 PROCEDURE — 28600 PR CLOSED RX TAR-METATAR DISLOCATION: ICD-10-PCS | Mod: S$PBB,LT,, | Performed by: PODIATRIST

## 2020-09-14 PROCEDURE — 99203 PR OFFICE/OUTPT VISIT, NEW, LEVL III, 30-44 MIN: ICD-10-PCS | Mod: 57,S$PBB,, | Performed by: PODIATRIST

## 2020-09-14 PROCEDURE — 99203 OFFICE O/P NEW LOW 30 MIN: CPT | Mod: 57,S$PBB,, | Performed by: PODIATRIST

## 2020-09-14 PROCEDURE — 28600 TREAT FOOT DISLOCATION: CPT | Mod: PBBFAC,PN | Performed by: PODIATRIST

## 2020-09-14 PROCEDURE — 28450 TX TARSAL B1 FX W/O MNPJ EA: CPT | Mod: 51,S$PBB,LT, | Performed by: PODIATRIST

## 2020-09-14 PROCEDURE — 28540 TREAT FOOT DISLOCATION: CPT | Mod: PBBFAC,PN | Performed by: PODIATRIST

## 2020-09-14 PROCEDURE — 28450 TX TARSAL B1 FX W/O MNPJ EA: CPT | Mod: PBBFAC,PN,LT | Performed by: PODIATRIST

## 2020-09-14 PROCEDURE — 28450 PR CLOSED RX TARSAL FX,EACH: ICD-10-PCS | Mod: 51,S$PBB,LT, | Performed by: PODIATRIST

## 2020-09-14 PROCEDURE — 28600 TREAT FOOT DISLOCATION: CPT | Mod: S$PBB,LT,, | Performed by: PODIATRIST

## 2020-09-14 PROCEDURE — 29515 APPLICATION SHORT LEG SPLINT: CPT | Mod: PBBFAC,PN,LT,59 | Performed by: PODIATRIST

## 2020-09-14 PROCEDURE — 28540: ICD-10-PCS | Mod: 51,S$PBB,LT, | Performed by: PODIATRIST

## 2020-09-14 PROCEDURE — 28540 TREAT FOOT DISLOCATION: CPT | Mod: 51,S$PBB,LT, | Performed by: PODIATRIST

## 2020-09-14 PROCEDURE — 99203 OFFICE O/P NEW LOW 30 MIN: CPT | Mod: PBBFAC,PN,25 | Performed by: PODIATRIST

## 2020-09-14 PROCEDURE — 99999 PR PBB SHADOW E&M-NEW PATIENT-LVL III: ICD-10-PCS | Mod: PBBFAC,,, | Performed by: PODIATRIST

## 2020-09-14 PROCEDURE — 99999 PR PBB SHADOW E&M-NEW PATIENT-LVL III: CPT | Mod: PBBFAC,,, | Performed by: PODIATRIST

## 2020-09-14 NOTE — PROGRESS NOTES
Subjective:      Patient ID: Rebecca Olvera is a 44 y.o. female.    Chief Complaint: Foot Pain (charcot foot )    Rebecca is a 44 y.o. female who presents to the clinic for evaluation and treatment of high risk feet.  She is accompanied by her mother.  Rebecca has a past medical history of Diabetes mellitus.   She states that she has been diabetic since before [place]/2005.  She has been poorly controlled up until about 2 months ago.  She has been able to reduce the A1c from 16+ down to 11 and then 8.3; she has also been able to reduce the weight from 293-225 lb by modifying her diet and increasing her activity.  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 went to see and this practitioner and [employer], Arlene Luke who put her on a water pill for fluid; she states that that did not help.  She continued to have swelling including after periods of sitting.  As such, she subsequently went to the ER at [facility] where they did an ultrasound to rule out blood clot to the left leg, which was negative.  She was referred to another nurse practitioner, Kalpesh Oconnor - he referred her to a podiatrist, a Dr. Sadler, who referred to another podiatrist this past June/July.  Dr. Cal Peterson did x-ray her and told her that she had 9 broken bones in her foot that would need to be fixed.  He describes what sounds like an ex fix but patient states that you will in [facility] would not allow for the procedure to be performed; she did check with her insurance and they did not know of the surgical plans.  However, they did refer hr here for evaluation.  Patient states that she is neuropathic enough that she does not have a significant amount of discomfort.   She does occasionally get some discomfort to the anterior ankle on the  right side.  She has a pair of diabetic shoes that she had recently obtained, about 2 months ago, but states the inserts were not custom-molded or heat molded to her foot. She is barefoot at home usually otherwise.  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    PCP: Primary Doctor No      Current shoe gear:  Affected Foot: Camwalker boots     Unaffected Foot: Extra depth shoes    Hemoglobin A1C   Date Value Ref Range Status   08/17/2020 8.3 (H) 4.7 - 5.6 % Final   02/18/2017 10.7 (H) 4.5 - 6.2 % Final     Comment:     According to ADA guidelines, hemoglobin A1C <7.0% represents  optimal control in non-pregnant diabetic patients.  Different  metrics may apply to specific populations.   Standards of Medical Care in Diabetes - 2016.  For the purpose of screening for the presence of diabetes:  <5.7%     Consistent with the absence of diabetes  5.7-6.4%  Consistent with increasing risk for diabetes   (prediabetes)  >or=6.5%  Consistent with diabetes  Currently no consensus exists for use of hemoglobin A1C  for diagnosis of diabetes for children.          Objective:      Review of Systems   Constitution: Positive for weight loss. Negative for decreased appetite.   Cardiovascular: Negative for claudication and leg swelling.   Skin: Negative for color change, dry skin and suspicious lesions.   Musculoskeletal: Negative for arthritis, joint pain, joint swelling, muscle cramps and myalgias.   Psychiatric/Behavioral: The patient is nervous/anxious.        Physical Exam  Vitals signs reviewed.   Constitutional:       General: She is not in acute distress.     Appearance: Normal appearance. She is well-developed. She is obese. She is not ill-appearing.   Cardiovascular:      Pulses:           Dorsalis pedis pulses are 1+ on the right side and 1+ on the left side.   Musculoskeletal:         General: Swelling (relates h/o LLE earlier this yr.) and  deformity (Charcot midft.collapse left) present. No tenderness or signs of injury.      Right ankle: Normal. She exhibits normal range of motion, no swelling, no deformity and normal pulse. No tenderness.      Left ankle: She exhibits normal range of motion, no swelling, no deformity and normal pulse. No tenderness.      Right lower leg: No edema.      Left lower le+ Edema (isolated to foot only) present.      Right foot: Normal range of motion. No deformity, Charcot foot or prominent metatarsal heads.      Left foot: Decreased range of motion. Deformity and Charcot foot present. No prominent metatarsal heads.   Feet:      Right foot:      Skin integrity: Skin integrity normal. No skin breakdown, erythema, warmth, callus or dry skin.      Left foot:      Skin integrity: Warmth present. No skin breakdown, erythema, callus or dry skin.   Skin:     General: Skin is warm and dry.      Capillary Refill: Capillary refill takes 2 to 3 seconds.      Coloration: Skin is not cyanotic.      Findings: No bruising, erythema, signs of injury or lesion.   Neurological:      Mental Status: She is alert and oriented to person, place, and time.      Sensory: Sensory deficit present.      Motor: Motor function is intact. No weakness or atrophy.      Gait: Gait abnormal (in short CAM walker LLE).   Psychiatric:         Attention and Perception: Attention normal.         Mood and Affect: Affect normal. Mood is anxious.         Speech: Speech normal.         Behavior: Behavior normal. Behavior is cooperative.     X-Ray Foot Complete Left  Narrative: EXAMINATION:  XR FOOT COMPLETE 3 VIEW LEFT    CLINICAL HISTORY:  Charcot left midfoot;.  Type 2 diabetes mellitus with diabetic neuropathic arthropathy    TECHNIQUE:  AP, lateral and oblique views of the left foot were performed.    COMPARISON:  None    FINDINGS:  There is fragmentation of the midfoot with osteolysis and lateral subluxation of the TMT joints.  There is sclerosis and  diffuse soft tissue swelling.  No soft tissue gas seen.  Plantar calcaneal spur noted.  Impression: Overall appearance in keeping with neuropathic arthropathy of the midfoot.  Superimposed infection is possible.    Electronically signed by: Obie Puente MD  Date:    09/14/2020  Time:    16:44     I independently reviewed the Xray images & report. There appears to be lateral subluxation/dislocation of the tarsomet.jts 2-5. Fragmentation appears to involve the middle & lateral cuneiforms, that are also dorsally dislocated. As such, there is also disruption of the navicular cuneiform joint. The cuboid appears to be prominent & subluxed/dislocated from the 4th & 5th metatarsal base, plantigrade. Soft tissue swelling plantar > dorsal. The 1st met.cuneiform joint is intact, as are the talonavicular & calcaneaocuboid.   Assessment:        Encounter Diagnoses   Name Primary?    Charcot foot due to diabetes mellitus Yes    Closed dislocation of tarsometatarsal joint of left foot, initial encounter     Dislocation of tarsal joint of left foot, initial encounter     Multiple closed tarsal fractures, left, initial encounter     Localized swelling of left foot     Controlled other specified diabetes mellitus with diabetic neuropathic arthropathy, with long-term current use of insulin     Diabetic autonomic neuropathy associated with diabetes mellitus due to underlying condition          Plan:        Charcot foot due to diabetes mellitus  Fracture dislocation tarso-metatarsal joint 2-5 & navicular cuneiform jt.    Xray left foot WB  Continue immobilization w/ CAM walker until quiescent, compression dsg x min. 3 months. Off work given restrictions & job description.  DM shoe w/ accommodative custom/heat-molded inserts.  F/u Xrays qmonthly; re-visit options once stabilized, including conservative measures such as DM shoes, AFO brace vs. surgery - MOSHE &/or ORIF w/ ex fix.    Controlled diabetes mellitus with diabetic  "arthropathy  tx of acute phase as stated.  Preventive care to include support & accommodation with DM shoe inserts. (metatarsal gelstrap dispensed for unaffected right foot MLA until appropriate shoe inserts obtained)  F/u [id number] or prn for routine DM foot care.    Dislocation of tarsal joint of left foot  Fragmented/fracture & dorsally dislocated middle & lateral ceniforms @ the navicular cuneiform joint.    See Charcot plan.    Closed dislocation of tarsometatarsal joint of left foot  Lateral subluxation/dislocation of the 2nd & 3rd metatarsocuneiform joints, 4th & 5th metatarsocuboid joint.  See Charcot plan    Multiple closed tarsal fractures, left, initial encounter  Fractures/fragmentation of middle & lateral ceneiforms.    See Charcot plan.    A Smyth layered compression dressing was applied, including Tubigrip, Specialist cast padding 3" x2 & 4" Coban from base of toes left foot to lower 1/2 lower leg. Patient is to keep this intact & dry until re-evaluation in 2 wks. when swelling is better controlled.     Rebecca was seen today for foot pain.    Diagnoses and all orders for this visit:    Charcot foot due to diabetes mellitus  -     X-Ray Foot Complete Left; Future  -     DIABETIC SHOES FOR HOME USE  -     X-Ray Foot Complete Left; Standing    Closed dislocation of tarsometatarsal joint of left foot, initial encounter    Dislocation of tarsal joint of left foot, initial encounter    Multiple closed tarsal fractures, left, initial encounter    Localized swelling of left foot    Controlled other specified diabetes mellitus with diabetic neuropathic arthropathy, with long-term current use of insulin  -     DIABETIC SHOES FOR HOME USE    Diabetic autonomic neuropathy associated with diabetes mellitus due to underlying condition    I counseled the patient on her conditions, their implications and medical management.         "

## 2020-09-14 NOTE — ASSESSMENT & PLAN NOTE
tx of acute phase as stated.  Preventive care to include support & accommodation with DM shoe inserts. (metatarsal gelstrap dispensed for unaffected right foot MLA until appropriate shoe inserts obtained)  F/u x0naogiy or prn for routine DM foot care.

## 2020-09-14 NOTE — ASSESSMENT & PLAN NOTE
Fracture dislocation tarso-metatarsal joint 2-5 & navicular cuneiform jt.    Xray left foot WB  Continue immobilization w/ CAM walker until quiescent, compression dsg x min. 3 months. Off work given restrictions & job description.  DM shoe w/ accommodative custom/heat-molded inserts.  F/u Xrays qmonthly; re-visit options once stabilized, including conservative measures such as DM shoes, AFO brace vs. surgery - MOSHE &/or ORIF w/ ex fix.

## 2020-09-14 NOTE — LETTER
September 14, 2020      [facility]  [address], [facility] [address]  [facility] [address]  Phone: [phone]  Fax: [phone]       Patient: Rebecca Olvera   YOB: 1975  Date of Visit: 09/14/2020    To Whom It May Concern:    Renetta Olvera  was at [facility] on 09/14/2020.  SHE  may return to work/school on  12/28/2020  {With restrictions. No bending no lifting greater than 10 pounds until further notice . If you have any questions or concerns, or if I can be of further assistance, please do not hesitate to contact me.    Sincerely,    Lizette Ralph LPN

## 2020-09-15 NOTE — ASSESSMENT & PLAN NOTE
Lateral subluxation/dislocation of the 2nd & 3rd metatarsocuneiform joints, 4th & 5th metatarsocuboid joint.  See Monicacot plan

## 2020-09-15 NOTE — ASSESSMENT & PLAN NOTE
Fragmented/fracture & dorsally dislocated middle & lateral ceniforms @ the navicular cuneiform joint.    See Charcot plan.

## 2020-09-28 ENCOUNTER — OFFICE VISIT (OUTPATIENT)
Dept: PODIATRY | Facility: CLINIC | Age: 45
End: 2020-09-28
Payer: MEDICAID

## 2020-09-28 VITALS — HEART RATE: 92 BPM | DIASTOLIC BLOOD PRESSURE: 72 MMHG | SYSTOLIC BLOOD PRESSURE: 111 MMHG

## 2020-09-28 DIAGNOSIS — E08.43 DIABETIC AUTONOMIC NEUROPATHY ASSOCIATED WITH DIABETES MELLITUS DUE TO UNDERLYING CONDITION: Primary | ICD-10-CM

## 2020-09-28 DIAGNOSIS — S92.202A: ICD-10-CM

## 2020-09-28 DIAGNOSIS — E11.610 DIABETIC NEUROPATHIC ARTHROPATHY: ICD-10-CM

## 2020-09-28 DIAGNOSIS — M79.672 PAIN IN LEFT FOOT: ICD-10-CM

## 2020-09-28 DIAGNOSIS — M21.70 LEG LENGTH DISCREPANCY: ICD-10-CM

## 2020-09-28 DIAGNOSIS — S93.325D CLOSED DISLOCATION OF TARSOMETATARSAL JOINT OF LEFT FOOT, SUBSEQUENT ENCOUNTER: ICD-10-CM

## 2020-09-28 DIAGNOSIS — S93.315D DISLOCATION OF TARSAL JOINT OF LEFT FOOT, SUBSEQUENT ENCOUNTER: ICD-10-CM

## 2020-09-28 DIAGNOSIS — R22.42 LOCALIZED SWELLING OF LEFT FOOT: ICD-10-CM

## 2020-09-28 PROCEDURE — 99999 PR PBB SHADOW E&M-EST. PATIENT-LVL III: ICD-10-PCS | Mod: PBBFAC,,, | Performed by: PODIATRIST

## 2020-09-28 PROCEDURE — 99024 PR POST-OP FOLLOW-UP VISIT: ICD-10-PCS | Mod: S$PBB,,, | Performed by: PODIATRIST

## 2020-09-28 PROCEDURE — 99213 OFFICE O/P EST LOW 20 MIN: CPT | Mod: PBBFAC,PN | Performed by: PODIATRIST

## 2020-09-28 PROCEDURE — 99024 POSTOP FOLLOW-UP VISIT: CPT | Mod: S$PBB,,, | Performed by: PODIATRIST

## 2020-09-28 PROCEDURE — 99999 PR PBB SHADOW E&M-EST. PATIENT-LVL III: CPT | Mod: PBBFAC,,, | Performed by: PODIATRIST

## 2020-09-29 NOTE — PROGRESS NOTES
Subjective:       Rebecca Olvera is a 44 y.o.  female who returns for follow-up of a left foot (tarsal, metatarsal) fractures/dislocations. The injury occurred several months ago; started in about March & worsened in June/July instant.  She reports no problems with the soft compression dsg. nor CAM walker since placement last visit here 2 wks.ago. Does use wheelchair as needed for distance. IIs accompanied again by her mother who also states she has had a lot less pain and no problems with swelling. She has had some sharp pains in the ankle intermittently, that are fleeting & 'deep'pain to bottom of heel occasionally. Also, c/o weakness to her 'knees' that were not present until recently - causing her to feel like 'they are giving way'. No c/o specific lower back concerns.    List of fractures/dislocations left foot include:  Fractures & Dislocation of tarsal joint   Fragmented/fracture & dorsally dislocated middle & lateral cuneiforms @ the naviculocuneiform joint.  Closed dislocation of tarsometatarsal joint   Lateral subluxation/dislocation of the 2nd & 3rd metatarsocuneiform joints, 4th & 5th metatarsocuboid joint.  Prominence plantar of anterior cuboid due dorsolateral dislocation of metatarsals    X-Ray Foot Complete Left  Narrative: EXAMINATION:  XR FOOT COMPLETE 3 VIEW LEFT    CLINICAL HISTORY:  Charcot left midfoot;.  Type 2 diabetes mellitus with diabetic neuropathic arthropathy    TECHNIQUE:  AP, lateral and oblique views of the left foot were performed.    COMPARISON:  None    FINDINGS:  There is fragmentation of the midfoot with osteolysis and lateral subluxation of the TMT joints.  There is sclerosis and diffuse soft tissue swelling.  No soft tissue gas seen.  Plantar calcaneal spur noted.  Impression: Overall appearance in keeping with neuropathic arthropathy of the midfoot.  Superimposed infection is possible.    Electronically signed by: Obie Puente  MD  Date:    09/14/2020  Time:    16:44    I independently reviewed the Xrays & with the patient/her mother @ this visit.    Objective:      General:   alert, appears stated age, cooperative and mild distress   Gait:    abnormal in CAM walker left w/ casual athletic shoe right     Circulation:   warm, well perfused, brisk capillary refill distal to the injury   Skin:   with no open wounds; slight discoloration/hyperpigmentation & increased warmth compared to surrounding foot, midfoot, consistent w/ location of injury   Swelling:  present - spongy plantar midfoot while dorsal & ant.foot/ankle has resolved   Deformity:  There is an obvious deformity of the midfoot consistent with collapse                 Physical Exam  Vitals signs reviewed.   Constitutional:       Appearance: Normal appearance. She is well-developed. She is obese. She is not ill-appearing.   Cardiovascular:      Pulses: Normal pulses.           Dorsalis pedis pulses are 2+ on the right side and 2+ on the left side.   Musculoskeletal:         General: Swelling, tenderness and deformity present. No signs of injury.      Right lower leg: No edema.      Left lower leg: No edema.      Right foot: Normal range of motion. No deformity or Charcot foot.      Left foot: Normal range of motion. Deformity and Charcot foot present.   Feet:      Right foot:      Skin integrity: Skin integrity normal. No skin breakdown, erythema, warmth or callus.      Left foot:      Skin integrity: Warmth present. No skin breakdown, erythema or callus.   Skin:     General: Skin is warm and dry.      Capillary Refill: Capillary refill takes less than 2 seconds.      Findings: No bruising, erythema, lesion or wound.   Neurological:      Mental Status: She is alert and oriented to person, place, and time.      Sensory: Sensory deficit present.      Motor: Motor function is intact. No weakness or abnormal muscle tone.      Gait: Gait abnormal.   Psychiatric:         Mood and Affect:  Mood normal. Mood is not anxious.         Speech: Speech normal.         Behavior: Behavior normal. Behavior is not agitated. Behavior is cooperative.          Assessment:      Resolving edema left foot. Charcot neuro arthropathy w/ mx fractures & dislocations midfoot as in HPI, Xrays.  Encounter Diagnoses   Name Primary?    Multiple closed tarsal fractures, left, initial encounter Yes    Localized swelling of left foot     Closed dislocation of tarsometatarsal joint of left foot, subsequent encounter     Dislocation of tarsal joint of left foot, subsequent encounter     Diabetic neuropathic arthropathy          Plan:           Rebecca was seen today for follow-up.    Diagnoses and all orders for this visit:    Multiple closed tarsal fractures, left, initial encounter    Localized swelling of left foot    Closed dislocation of tarsometatarsal joint of left foot, subsequent encounter    Dislocation of tarsal joint of left foot, subsequent encounter    Diabetic neuropathic arthropathy    I counseled the patient on her conditions, their implications and medical management.    Counceled re: DM control of BS to ameliorate related complications & prevent others.     Continue immobilization w/ CAM walker until quiescent, compression dsg x min. 3 months. Off work given restrictions & job description until stabilized.    DM shoe w/ accommodative custom/heat-molded inserts for prevention right foot collapse & accommodation of left - vendor contacted again.    F/u Xrays qmonthly; re-visit options once stabilized, including DM shoes, AFO brace vs. surgery - MOSHE &/or resection plantar prominence if develop pressure areas vs ORIF/reconstruction midfoot w/ ex fix.     Continue metatarsal gelstrap dispensed for unaffected right foot MLA until appropriate shoe inserts w/ arch support obtained.    Dispensed gel heel cups for heel pain (B/L to avoid limb length discrepancy).    F/u in 4-6wks & reXray.

## 2020-10-26 ENCOUNTER — OFFICE VISIT (OUTPATIENT)
Dept: PODIATRY | Facility: CLINIC | Age: 45
End: 2020-10-26
Payer: MEDICAID

## 2020-10-26 VITALS
BODY MASS INDEX: 38.61 KG/M2 | HEART RATE: 83 BPM | SYSTOLIC BLOOD PRESSURE: 101 MMHG | WEIGHT: 246 LBS | HEIGHT: 67 IN | DIASTOLIC BLOOD PRESSURE: 69 MMHG

## 2020-10-26 DIAGNOSIS — E66.9 OBESITY (BMI 30-39.9): ICD-10-CM

## 2020-10-26 DIAGNOSIS — S90.822A BLISTER OF PLANTAR ASPECT OF LEFT FOOT, INITIAL ENCOUNTER: ICD-10-CM

## 2020-10-26 DIAGNOSIS — E11.49 TYPE 2 DIABETES MELLITUS WITH OTHER NEUROLOGIC COMPLICATION, WITHOUT LONG-TERM CURRENT USE OF INSULIN: ICD-10-CM

## 2020-10-26 DIAGNOSIS — S92.202D: ICD-10-CM

## 2020-10-26 DIAGNOSIS — L89.893 PRESSURE ULCER OF LEFT FOOT, STAGE 3: ICD-10-CM

## 2020-10-26 DIAGNOSIS — S93.325D CLOSED DISLOCATION OF TARSOMETATARSAL JOINT OF LEFT FOOT, SUBSEQUENT ENCOUNTER: ICD-10-CM

## 2020-10-26 DIAGNOSIS — S93.315D DISLOCATION OF TARSAL JOINT OF LEFT FOOT, SUBSEQUENT ENCOUNTER: ICD-10-CM

## 2020-10-26 DIAGNOSIS — R60.0 EDEMA OF EXTREMITIES: ICD-10-CM

## 2020-10-26 DIAGNOSIS — L97.521 ULCER OF LEFT FOOT, LIMITED TO BREAKDOWN OF SKIN: ICD-10-CM

## 2020-10-26 DIAGNOSIS — E11.610: ICD-10-CM

## 2020-10-26 DIAGNOSIS — S91.302A OPEN WOUND OF LEFT FOOT, INITIAL ENCOUNTER: Primary | ICD-10-CM

## 2020-10-26 DIAGNOSIS — M14.672 CHARCOT'S JOINT OF LEFT FOOT: ICD-10-CM

## 2020-10-26 DIAGNOSIS — R22.42 LOCALIZED SWELLING OF LEFT FOOT: ICD-10-CM

## 2020-10-26 PROCEDURE — 99024 PR POST-OP FOLLOW-UP VISIT: ICD-10-PCS | Mod: S$PBB,,, | Performed by: PODIATRIST

## 2020-10-26 PROCEDURE — 97597 DBRDMT OPN WND 1ST 20 CM/<: CPT | Mod: PBBFAC,PN | Performed by: PODIATRIST

## 2020-10-26 PROCEDURE — 99499 NO LOS: ICD-10-PCS | Mod: S$PBB,,, | Performed by: PODIATRIST

## 2020-10-26 PROCEDURE — 99999 PR PBB SHADOW E&M-EST. PATIENT-LVL IV: CPT | Mod: PBBFAC,,, | Performed by: PODIATRIST

## 2020-10-26 PROCEDURE — 99214 OFFICE O/P EST MOD 30 MIN: CPT | Mod: PBBFAC,PN,25 | Performed by: PODIATRIST

## 2020-10-26 PROCEDURE — 97597 WOUND DEBRIDEMENT: ICD-10-PCS | Mod: 79,S$PBB,, | Performed by: PODIATRIST

## 2020-10-26 PROCEDURE — 99024 POSTOP FOLLOW-UP VISIT: CPT | Mod: S$PBB,,, | Performed by: PODIATRIST

## 2020-10-26 PROCEDURE — 99499 UNLISTED E&M SERVICE: CPT | Mod: S$PBB,,, | Performed by: PODIATRIST

## 2020-10-26 PROCEDURE — 99999 PR PBB SHADOW E&M-EST. PATIENT-LVL IV: ICD-10-PCS | Mod: PBBFAC,,, | Performed by: PODIATRIST

## 2020-10-26 RX ORDER — INSULIN GLARGINE 100 [IU]/ML
44 INJECTION, SOLUTION SUBCUTANEOUS
COMMUNITY
Start: 2020-10-11

## 2020-10-26 RX ORDER — ASPIRIN 81 MG/1
81 TABLET ORAL DAILY
COMMUNITY

## 2020-10-26 RX ORDER — CLINDAMYCIN HYDROCHLORIDE 300 MG/1
300 CAPSULE ORAL EVERY 8 HOURS
Qty: 30 CAPSULE | Refills: 0 | Status: SHIPPED | OUTPATIENT
Start: 2020-10-26 | End: 2020-11-05

## 2020-10-26 RX ORDER — GABAPENTIN 300 MG/1
300 CAPSULE ORAL 3 TIMES DAILY
COMMUNITY
Start: 2020-10-06

## 2020-10-26 RX ORDER — METFORMIN HYDROCHLORIDE 1000 MG/1
1000 TABLET ORAL 2 TIMES DAILY
Status: ON HOLD | COMMUNITY
Start: 2020-10-02 | End: 2021-09-24

## 2020-10-26 RX ORDER — TOPIRAMATE 50 MG/1
50 TABLET, FILM COATED ORAL 2 TIMES DAILY
COMMUNITY
Start: 2020-09-15

## 2020-10-26 RX ORDER — LEVETIRACETAM 500 MG/1
500 TABLET ORAL 2 TIMES DAILY
COMMUNITY

## 2020-10-26 RX ORDER — TRAMADOL HYDROCHLORIDE AND ACETAMINOPHEN 37.5; 325 MG/1; MG/1
1 TABLET, FILM COATED ORAL EVERY 6 HOURS PRN
Qty: 30 TABLET | Refills: 0 | Status: SHIPPED | OUTPATIENT
Start: 2020-10-26 | End: 2021-03-01 | Stop reason: SDUPTHER

## 2020-10-26 RX ORDER — ATORVASTATIN CALCIUM 20 MG/1
20 TABLET, FILM COATED ORAL
COMMUNITY

## 2020-10-26 RX ORDER — NAPROXEN 500 MG/1
500 TABLET ORAL
Status: ON HOLD | COMMUNITY
End: 2020-11-16 | Stop reason: HOSPADM

## 2020-10-26 RX ORDER — LISINOPRIL AND HYDROCHLOROTHIAZIDE 12.5; 2 MG/1; MG/1
2 TABLET ORAL DAILY
COMMUNITY
Start: 2020-08-26

## 2020-10-26 RX ORDER — ERGOCALCIFEROL 1.25 MG/1
50000 CAPSULE ORAL WEEKLY
COMMUNITY

## 2020-10-26 NOTE — PROCEDURES
Wound Debridement    Date/Time: 10/26/2020 10:00 AM  Performed by: Irene Tripathi DPM  Authorized by: Irene Tripathi DPM       Wound Details:    Location:  Left foot    Location:  Left Plantar    Type of Debridement:  Non-excisional       Length (cm):  2.3       Area (sq cm):  3.68       Width (cm):  1.6       Percent Debrided (%):  100       Depth (cm):  0.2       Total Area Debrided (sq cm):  3.68    Depth of debridement:  Epidermis/Dermis    Tissue debrided:  Epidermis and Dermis    Devitalized tissue debrided:  Callus and Other    Instruments:  Blade and Nippers    Bleeding:  None  Patient tolerance:  Patient tolerated the procedure well with no immediate complications

## 2020-10-26 NOTE — PROGRESS NOTES
"Subjective:      Rebecca Olvera is a 45 y.o. female who presents for evaluation of a foot wound. The patient has had an area 'hole' bottom of left foot only since around Sat/Sun. States she checks her feet daily & noticed it w/ her sister when she was admitted Sat., for the wk.end d/t seizures, Sat. through Mon. Found that the seizures were caused by a bladder infection that developed from Jardiance. Is now on Keppra.  She indicates the left plantar foot for 2 days.  This has been treated at home with light white socks only - slight drainage today only.  Past history significant for diabetes, morbid obesity and reent midfoot fracture/dislocation, Charcot neuro arthopathys. The patient reports drainage and pain, but denies fever, redness and POP; pain lateral foot random. At the same time, states is otherwise profoundly numb to ankles .    The following portions of the patient's history were reviewed and updated as appropriate: allergies, current medications, past family history, past medical history, past social history, past surgical history and problem list.     Review of Systems  Pertinent items are noted in HPI.      Objective:      /69 (BP Location: Left arm, Patient Position: Sitting, BP Method: Medium (Automatic))   Pulse 83   Ht 5' 7" (1.702 m)   Wt 111.6 kg (246 lb)   BMI 38.53 kg/m²     General:  alert, appears stated age, cooperative, mild distress and morbidly obese               Pulse exam:: DP pulses easily palpable.   Neurologic:  plantar sensation absent.      Lower extremity ulcer located on left plantar foot  Dimensions:   2.3 cm x 1.6 cm.   Depth:   0.2cm   Exudate:   none   Erythema:   none   Edema:  moderate; related to previously noted Charcot - decreased & isolated to dorsal & plantar midfoot only - no extension preoximally   Granulation:  none       Details    Reading Physician Reading Date Result Priority   Obie Puente MD  [phone] 10/26/2020 Routine    "   Narrative & Impression     EXAMINATION:  XR FOOT COMPLETE 3 VIEW LEFT     CLINICAL HISTORY:  Charcot midfoot;.  Type 2 diabetes mellitus with diabetic neuropathic arthropathy     TECHNIQUE:  AP, lateral and oblique views of the left foot were performed.     COMPARISON:  09/14/2020     FINDINGS:  Advanced destructive changes of the midfoot with fragmentation, sclerosis, and malalignment, similar to prior study.  Soft tissue swelling noted.  No soft tissue gas.     Impression:     Abnormal appearance of the midfoot in keeping with neuropathic arthropathy.  Chronic osteomyelitis may have a similar appearance.  No significant change from prior study.        Electronically signed by: Obie Puente MD  Date:                                            10/26/2020  Time:                                           11:13     X-Ray Foot Complete Left  Narrative: EXAMINATION:  XR FOOT COMPLETE 3 VIEW LEFT    CLINICAL HISTORY:  Charcot midfoot;.  Type 2 diabetes mellitus with diabetic neuropathic arthropathy    TECHNIQUE:  AP, lateral and oblique views of the left foot were performed.    COMPARISON:  09/14/2020    FINDINGS:  Advanced destructive changes of the midfoot with fragmentation, sclerosis, and malalignment, similar to prior study.  Soft tissue swelling noted.  No soft tissue gas.  Impression: Abnormal appearance of the midfoot in keeping with neuropathic arthropathy.  Chronic osteomyelitis may have a similar appearance.  No significant change from prior study.    Electronically signed by: Obie Puente MD  Date:    10/26/2020  Time:    11:13    Assessment/Plan:     Diabetic foot ulcer without signs of infection.  Complicated by: neuropathy, above-mentioned Charcot neuroarthropathy.        Area of concern sharply debrided w/ sterile #15 blade & tissue nippers/ patient states no c/o pain or discomfort. Dsg. Applied & cushioning.     Patient is familiar w/ wound care - will apply wet-dry Betadine qd. Also given  metatarsal gelstrap for cushioning of area of prominence (cuboid) & compression. Cont.use of CAM walker.   Foot care instructions provided. Topical ulcer treatment prescribed. Antibiotic therapy as prescribed. Imaging studies ordered. Follow-up in 1 week. She would eventually like to have surgery for this fracture dislocation - will refer her appropriately.  Rx Clindamycin 300mg tid x 10days d/t wound & DM.

## 2020-10-27 ENCOUNTER — TELEPHONE (OUTPATIENT)
Dept: PODIATRY | Facility: CLINIC | Age: 45
End: 2020-10-27

## 2020-10-27 NOTE — TELEPHONE ENCOUNTER
Patient called regarding using medi honey ointment on her open wound on bottom of foot . Md was notified about the request and said it was okay for patient to use the medi honey .

## 2020-10-27 NOTE — ASSESSMENT & PLAN NOTE
Slight decrease in BMI to 38.53...advised re: cont.control of diet & exercise as related to this & comorbidities

## 2020-10-27 NOTE — ASSESSMENT & PLAN NOTE
Area of deepest involvementcentromedial to deroofed blister - 0.8cm diameter w/ exposed subcut.tissue, down 0.2cm depth, after debridement of hyperkeratosis, & non viable macerated epidermis & dermis.

## 2020-10-27 NOTE — ASSESSMENT & PLAN NOTE
Deep blister due to shearing plantar lateral midfoot w/ dried sublesional blood lat.aspect - 0.5cm diameter, through epidermis only. Entire debrided blister measures 2.6cm long x 1.6cm width. No active drainage nor exudate. No sign of acute infection.

## 2020-11-02 ENCOUNTER — OFFICE VISIT (OUTPATIENT)
Dept: PODIATRY | Facility: CLINIC | Age: 45
End: 2020-11-02
Payer: MEDICAID

## 2020-11-02 VITALS
HEIGHT: 67 IN | HEART RATE: 83 BPM | SYSTOLIC BLOOD PRESSURE: 103 MMHG | DIASTOLIC BLOOD PRESSURE: 67 MMHG | BODY MASS INDEX: 38.53 KG/M2

## 2020-11-02 DIAGNOSIS — L89.893 PRESSURE ULCER OF LEFT FOOT, STAGE 3: ICD-10-CM

## 2020-11-02 DIAGNOSIS — E11.610: Primary | ICD-10-CM

## 2020-11-02 DIAGNOSIS — R22.42 LOCALIZED SWELLING OF LEFT FOOT: ICD-10-CM

## 2020-11-02 DIAGNOSIS — S90.822D: ICD-10-CM

## 2020-11-02 DIAGNOSIS — S93.325D CLOSED DISLOCATION OF TARSOMETATARSAL JOINT OF LEFT FOOT, SUBSEQUENT ENCOUNTER: ICD-10-CM

## 2020-11-02 DIAGNOSIS — S92.202D: ICD-10-CM

## 2020-11-02 DIAGNOSIS — L97.522 ULCER OF LEFT FOOT WITH FAT LAYER EXPOSED: ICD-10-CM
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
## 2020-11-02 DIAGNOSIS — E11.43 TYPE 2 DIABETES MELLITUS WITH DIABETIC AUTONOMIC NEUROPATHY, WITHOUT LONG-TERM CURRENT USE OF INSULIN: ICD-10-CM

## 2020-11-02 DIAGNOSIS — S93.315D DISLOCATION OF TARSAL JOINT OF LEFT FOOT, SUBSEQUENT ENCOUNTER: ICD-10-CM

## 2020-11-02 PROCEDURE — 99214 OFFICE O/P EST MOD 30 MIN: CPT | Mod: PBBFAC,PN,25 | Performed by: PODIATRIST

## 2020-11-02 PROCEDURE — 99499 UNLISTED E&M SERVICE: CPT | Mod: S$PBB,,, | Performed by: PODIATRIST

## 2020-11-02 PROCEDURE — 99499 NO LOS: ICD-10-PCS | Mod: S$PBB,,, | Performed by: PODIATRIST

## 2020-11-02 PROCEDURE — 99024 POSTOP FOLLOW-UP VISIT: CPT | Mod: S$PBB,,, | Performed by: PODIATRIST

## 2020-11-02 PROCEDURE — 99024 PR POST-OP FOLLOW-UP VISIT: ICD-10-PCS | Mod: S$PBB,,, | Performed by: PODIATRIST

## 2020-11-02 PROCEDURE — 99999 PR PBB SHADOW E&M-EST. PATIENT-LVL IV: ICD-10-PCS | Mod: PBBFAC,,, | Performed by: PODIATRIST

## 2020-11-02 PROCEDURE — 97597 DBRDMT OPN WND 1ST 20 CM/<: CPT | Mod: PBBFAC,PN | Performed by: PODIATRIST

## 2020-11-02 PROCEDURE — 99999 PR PBB SHADOW E&M-EST. PATIENT-LVL IV: CPT | Mod: PBBFAC,,, | Performed by: PODIATRIST

## 2020-11-03 NOTE — PROCEDURES
Wound Debridement    Date/Time: 11/2/2020 1:00 PM  Performed by: Irene Tripathi DPM  Authorized by: Irene Tripathi DPM       Wound Details:    Location:  Left foot    Location:  Left Plantar    Type of Debridement:  Non-excisional       Length (cm):  1.5       Area (sq cm):  2.1       Width (cm):  1.4       Percent Debrided (%):  100       Depth (cm):  0.2       Total Area Debrided (sq cm):  2.1    Depth of debridement:  Epidermis/Dermis    Tissue debrided:  Epidermis and Dermis    Devitalized tissue debrided:  Callus, Fibrin and Slough    Instruments:  Blade and Nippers    Bleeding:  None  Patient tolerance:  Patient tolerated the procedure well with no immediate complications     Utilizing sterile instrumentation including a #15 Blade and tissue nippers, the previously de roofed blister was debrided of nonviable tissue including macerated hyperkeratosis; proximal 1/3 of this same wound is slightly deeper and shows some exposed the subcutaneous tissue-measuring only 0.4 cm and is also easily at debrided of slough and fibrous tissue.  No sign of active drainage nor exudate.  No bleeding noted.    Dressing applied including wet to dry Betadine.  Patient may continue with use of Medihoney if preferred.  To return here for follow-up wound check in 2 weeks or p.r.n.

## 2020-11-03 NOTE — PROGRESS NOTES
Subjective:       Rebecca Olvera is a 45 y.o. who returns for follow-up of a left foot midfoot Charcot fracture dislocation. The injury occurred several months ago. She reports no problems with the cast or injury, and no problems with numbness, or tingling in her ft; has noticed the swelling has gone down.    She had also developed a dark area to the plantar lateral aspect of left midfoot last week and is here   for evaluation of a foot ulcer. The patient has had an ulcer of her ft after debridement and decompression of the deep blister, likely due to pressure from the deformed foot along with neuropathy. This has been treated at home with wet to dry, initially Betadine and then switched to Medihoney q.d. dressing changes.  Past history significant for diabetes, morbid obesity and profound diabetic neuropathy. The patient denies associated drainage, fever, pain, or redness with the ulcer.  States that she has noticed that it feels different in that it has improved and she does not feel like she is rocking on the foot; is a with the swelling has also come down.    Has been using a CAM walker as well as a metatarsal gel strap to cushion the wound after applying dressing.  She did complete her antibiotics.    The following portions of the patient's history were reviewed and updated as appropriate: allergies, current medications, past family history, past medical history, past social history, past surgical history and problem list.     Pertinent items are noted in HPI.    Objective:      General:   alert, appears stated age, cooperative and no distress   Gait:    Normal aside from the fact that she is in a Cam walker   Left ft  Cam walker condition:  good   Circulation:   warm, well perfused, brisk capillary refill distal to the injury   Skin:   Open wound plantar lateral midfoot has decreased in size; this is not a temperature differential across the midfoot as compared with previously    Swelling:  present    Deformity:  There is an obvious deformity of the midfoot in that it is widened and there is no medial longitudinal arch contour       Sensation:   deficit noted -as previously in initial evaluation, profound neuropathy noted in admitted to bilateral feet about to the level of the ankle   Tenderness:   None      Imaging  X-Ray Foot Complete Left  Narrative: EXAMINATION:  XR FOOT COMPLETE 3 VIEW LEFT    CLINICAL HISTORY:  Charcot midfoot;.  Type 2 diabetes mellitus with diabetic neuropathic arthropathy    TECHNIQUE:  AP, lateral and oblique views of the left foot were performed.    COMPARISON:  09/14/2020    FINDINGS:  Advanced destructive changes of the midfoot with fragmentation, sclerosis, and malalignment, similar to prior study.  Soft tissue swelling noted.  No soft tissue gas.  Impression: Abnormal appearance of the midfoot in keeping with neuropathic arthropathy.  Chronic osteomyelitis may have a similar appearance.  No significant change from prior study.    Electronically signed by: Obie Puente MD  Date:    10/26/2020  Time:    11:13    Lower extremity ulcer located on left plantar foot  Dimensions:   1.5 cm x 1.4 cm; surrounding macerated hyperkeratosis and there is a 0.4 cm diameter deeper lesion to the level of the subcutaneous tissue proximal laterally within the main ulcer, with slough and fibrin.   Depth:    2 mm.   Exudate:   none   Erythema:   none   Edema:  mild   Granulation:  minimal     Assessment/Plan:    Clinically, no sign of increased swelling nor redness to the left foot despite recent history of a fall (2 days ago)    Diabetic foot ulcer without signs of infection.  Complicated by: neuropathy, midfoot fracture dislocation due to diabetic Charcot neuro arthropathy.    In you with the use of CAM walker and gel strap for cushioning of same.  Continue with q.d. dressing changes to the left foot wound.  Follow up will be in 2 weeks for cast check and reevaluation with xrays.      Foot care  instructions provided. Topical ulcer treatment prescribed. At patient's request, we will also refer her for Charcot reconstruction.  A referral has been sent to Dr. Danita Barboza at [facility].

## 2020-11-11 ENCOUNTER — OFFICE VISIT (OUTPATIENT)
Dept: PODIATRY | Facility: CLINIC | Age: 45
End: 2020-11-11
Payer: MEDICAID

## 2020-11-11 VITALS
HEIGHT: 67 IN | DIASTOLIC BLOOD PRESSURE: 72 MMHG | TEMPERATURE: 99 F | WEIGHT: 225.38 LBS | BODY MASS INDEX: 35.37 KG/M2 | HEART RATE: 95 BPM | RESPIRATION RATE: 18 BRPM | SYSTOLIC BLOOD PRESSURE: 121 MMHG

## 2020-11-11 DIAGNOSIS — E11.610: ICD-10-CM

## 2020-11-11 DIAGNOSIS — S92.202D: ICD-10-CM

## 2020-11-11 DIAGNOSIS — E11.49 TYPE 2 DIABETES MELLITUS WITH OTHER NEUROLOGIC COMPLICATION, WITH LONG-TERM CURRENT USE OF INSULIN: ICD-10-CM

## 2020-11-11 DIAGNOSIS — M14.60 ARTHROPATHY ASSOCIATED WITH NEUROLOGICAL DISORDER: ICD-10-CM

## 2020-11-11 DIAGNOSIS — R22.42 LOCALIZED SWELLING OF LEFT FOOT: ICD-10-CM

## 2020-11-11 DIAGNOSIS — S93.315D DISLOCATION OF TARSAL JOINT OF LEFT FOOT, SUBSEQUENT ENCOUNTER: ICD-10-CM

## 2020-11-11 DIAGNOSIS — L97.526 ULCER OF LEFT FOOT WITH BONE INVOLVEMENT WITHOUT EVIDENCE OF NECROSIS: ICD-10-CM

## 2020-11-11 DIAGNOSIS — L03.119 CELLULITIS AND ABSCESS OF FOOT: Primary | ICD-10-CM

## 2020-11-11 DIAGNOSIS — E66.9 OBESITY (BMI 30-39.9): ICD-10-CM

## 2020-11-11 DIAGNOSIS — Z79.4 TYPE 2 DIABETES MELLITUS WITH OTHER NEUROLOGIC COMPLICATION, WITH LONG-TERM CURRENT USE OF INSULIN: ICD-10-CM

## 2020-11-11 DIAGNOSIS — S93.325D CLOSED DISLOCATION OF TARSOMETATARSAL JOINT OF LEFT FOOT, SUBSEQUENT ENCOUNTER: ICD-10-CM

## 2020-11-11 DIAGNOSIS — M86.172 OTHER ACUTE OSTEOMYELITIS OF LEFT FOOT: ICD-10-CM

## 2020-11-11 DIAGNOSIS — L89.894 PRESSURE ULCER OF LEFT FOOT, STAGE 4: ICD-10-CM

## 2020-11-11 DIAGNOSIS — E11.43 TYPE 2 DIABETES MELLITUS WITH DIABETIC AUTONOMIC NEUROPATHY, WITHOUT LONG-TERM CURRENT USE OF INSULIN: ICD-10-CM

## 2020-11-11 DIAGNOSIS — L02.619 CELLULITIS AND ABSCESS OF FOOT: Primary | ICD-10-CM

## 2020-11-11 PROBLEM — M86.9 OSTEOMYELITIS OF LEFT FOOT: Status: ACTIVE | Noted: 2020-11-11

## 2020-11-11 PROBLEM — L08.9 FOOT INFECTION: Status: ACTIVE | Noted: 2020-11-11

## 2020-11-11 PROCEDURE — 99024 PR POST-OP FOLLOW-UP VISIT: ICD-10-PCS | Mod: S$PBB,,, | Performed by: PODIATRIST

## 2020-11-11 PROCEDURE — 99999 PR PBB SHADOW E&M-EST. PATIENT-LVL V: CPT | Mod: PBBFAC,,, | Performed by: PODIATRIST

## 2020-11-11 PROCEDURE — 99215 OFFICE O/P EST HI 40 MIN: CPT | Mod: PBBFAC,PN | Performed by: PODIATRIST

## 2020-11-11 PROCEDURE — 99999 PR PBB SHADOW E&M-EST. PATIENT-LVL V: ICD-10-PCS | Mod: PBBFAC,,, | Performed by: PODIATRIST

## 2020-11-11 PROCEDURE — 99024 POSTOP FOLLOW-UP VISIT: CPT | Mod: S$PBB,,, | Performed by: PODIATRIST

## 2020-11-11 RX ORDER — PEN NEEDLE, DIABETIC 31 GX5/16"
NEEDLE, DISPOSABLE MISCELLANEOUS
COMMUNITY
Start: 2020-09-09

## 2020-11-11 RX ORDER — FLUTICASONE PROPIONATE 50 MCG
SPRAY, SUSPENSION (ML) NASAL
COMMUNITY

## 2020-11-11 RX ORDER — DORZOLAMIDE HCL 20 MG/ML
SOLUTION/ DROPS OPHTHALMIC
COMMUNITY
Start: 2020-11-03

## 2020-11-11 RX ORDER — LANCETS 33 GAUGE
EACH MISCELLANEOUS
COMMUNITY
Start: 2020-08-09

## 2020-11-11 RX ORDER — TRIAMCINOLONE ACETONIDE 1 MG/G
OINTMENT TOPICAL
Status: ON HOLD | COMMUNITY
Start: 2020-10-28 | End: 2021-09-24

## 2020-11-11 RX ORDER — GLIPIZIDE 2.5 MG/1
TABLET, EXTENDED RELEASE ORAL
Status: ON HOLD | COMMUNITY
Start: 2020-10-28 | End: 2021-09-24

## 2020-11-11 RX ORDER — CALCIUM CITRATE/VITAMIN D3 200MG-6.25
TABLET ORAL
COMMUNITY
Start: 2020-08-13

## 2020-11-11 NOTE — PROGRESS NOTES
"Subjective:      Rebecca Olvera is a 45 y.o. female who presents for evaluation of a foot ulcer. She is ccompanied by her sister. Had called this am c/o chills & pus after a shower this am. Had completed Abx & had no c/o until this am. Was told to come is for evaluation.  The patient has had an ulcer of her left plantar foot for about 2-3wks, around 10/24/20. This has been treated at home, & seen in clinic, - w/ qd dsg.changes & PO Abx,  with wet to dry dressing changes.  Past history significant for diabetes, morbid obesity and Charcot fracture dislocation of Lisfranc's joint & adjacent tarsal-metatarsals left foot. The patient reports drainage and chills, but denies pain and redness. Her Charcot collapse appeared to develop in June w/ swelling & progressive deformity. She had presented to this clinic in September with full blown neuroarthropathy that was becoming quiescent w/ immobilization in a modified CAM walker, prior to development of this ulcer.    The following portions of the patient's history were reviewed and updated as appropriate: allergies, current medications, past family history, past medical history, past social history, past surgical history and problem list.     Review of Systems  Pertinent items are noted in HPI.    Review of Systems   Constitutional: Positive for chills, malaise/fatigue and weight loss. Negative for diaphoresis.   Cardiovascular: Negative for claudication and leg swelling.   Musculoskeletal: Negative for joint pain and myalgias.   Neurological: Positive for sensory change and seizures. Negative for tingling, focal weakness and weakness.   Psychiatric/Behavioral: The patient is nervous/anxious.       Objective:      /72 (BP Location: Right arm, Patient Position: Sitting, BP Method: Large (Automatic))   Pulse 95   Temp 98.7 °F (37.1 °C)   Resp 18   Ht 5' 7" (1.702 m)   Wt 102.2 kg (225 lb 6.4 oz)   BMI 35.30 kg/m²    alert, appears stated age, cooperative, mild " distress, moderately obese and pale        Physical Exam   Constitutional: She is oriented to person, place, and time. Vital signs are normal. She appears dehydrated. She appears distressed.   Cardiovascular: Intact distal pulses.   Pulses:       Dorsalis pedis pulses are 2+ on the right side and 2+ on the left side.        Posterior tibial pulses are 1+ on the right side and 1+ on the left side.   Musculoskeletal: Normal range of motion.         General: Deformity and edema present. No tenderness.      Right foot: Normal. Normal range of motion and normal capillary refill. No tenderness, bony tenderness, swelling, crepitus or deformity.      Left foot: Normal range of motion and normal capillary refill. Swelling and deformity present. No tenderness, bony tenderness or crepitus.        Feet:    Neurological: She is alert and oriented to person, place, and time. She has normal strength. She is not agitated and not disoriented. She displays no weakness. A sensory deficit is present. Gait (in CAM walker w/ heel cups left) abnormal.   Skin: Skin is warm and dry. No bruising and no ecchymosis noted. She is not diaphoretic. No erythema. There is pallor. Nails show no clubbing.   Psychiatric: Affect normal. Her mood appears anxious. She is not agitated.                             Lower extremity ulcer located on left plantar foot  Dimensions:   0.5 cm diameter   Depth:    15+ mm.   Exudate:   purulent   Erythema:   none   Edema:  moderate   Granulation:  none      Assessment/Plan:  Problem List Items Addressed This Visit        Derm    Pressure ulcer of left foot, stage 4    Current Assessment & Plan     Progressive worsening of ulcer - depth probes to bone.  Will require full offloading.            ID    Osteomyelitis of left foot    Current Assessment & Plan     Plan: Admit to hospitalist for IV Abx & surgery scheduled for Fri.am to include I&D & debridement plantar left midfoot         Relevant Orders    Case Request  Operating Room: INCISION AND DRAINAGE, LOWER EXTREMITY (Completed)    CBC auto differential    XR FOOT COMPLETE 3 VIEW LEFT    Cellulitis and abscess of foot - Primary    Current Assessment & Plan     As above osteomyelitis plan.            Endocrine    Obesity (BMI 30-39.9)    Current Assessment & Plan     BMI 35.24kg/m2  Patient has continued to decrease weight w/ diet & exercise.         Type 2 diabetes mellitus with neurologic complication, without long-term current use of insulin    Current Assessment & Plan     Hemoglobin A1C   Date Value Ref Range Status   08/17/2020 8.3 (H) 4.7 - 5.6 % Final   02/18/2017 10.7 (H) 4.5 - 6.2 % Final     Comment:     According to ADA guidelines, hemoglobin A1C <7.0% represents  optimal control in non-pregnant diabetic patients.  Different  metrics may apply to specific populations.   Standards of Medical Care in Diabetes - 2016.  For the purpose of screening for the presence of diabetes:  <5.7%     Consistent with the absence of diabetes  5.7-6.4%  Consistent with increasing risk for diabetes   (prediabetes)  >or=6.5%  Consistent with diabetes  Currently no consensus exists for use of hemoglobin A1C  for diagnosis of diabetes for children.       Patient cont. To diet & exercise. May refer to endocrinology & dietition for further recommendations once stabilized.         Relevant Medications    glipiZIDE (GLUCOTROL) 2.5 MG TR24       Orthopedic    Dislocation of tarsal joint of left foot    Closed dislocation of tarsometatarsal joint of left foot       Other    Multiple closed tarsal fractures of left foot, with routine healing, subsequent encounter    Localized swelling of left foot    Neuropathic arthropathy due to type 2 diabetes mellitus    Current Assessment & Plan     Has been referred to , for Charcot reconstruction if appropriate, once stabilized.         Relevant Medications    glipiZIDE (GLUCOTROL) 2.5 MG TR24      Other Visit Diagnoses     Type 2 diabetes  mellitus with other neurologic complication, with long-term current use of insulin        Relevant Medications    glipiZIDE (GLUCOTROL) 2.5 MG TR24    Ulcer of left foot with bone involvement without evidence of necrosis        Arthropathy associated with neurological disorder            Urgent:   Diabetic foot ulcer with signs of infection.  Complicated by: neuropathy, Charcot neuroarthropathy midfoot left..           Admit to hospitalist though the ED.  accepting. Surgery left foot scheduled for Fri.am.

## 2020-11-12 NOTE — ASSESSMENT & PLAN NOTE
Plan: Admit to hospitalist for IV Abx & surgery scheduled for Fri.am to include I&D & debridement plantar left midfoot

## 2020-11-12 NOTE — ASSESSMENT & PLAN NOTE
Hemoglobin A1C   Date Value Ref Range Status   08/17/2020 8.3 (H) 4.7 - 5.6 % Final   02/18/2017 10.7 (H) 4.5 - 6.2 % Final     Comment:     According to ADA guidelines, hemoglobin A1C <7.0% represents  optimal control in non-pregnant diabetic patients.  Different  metrics may apply to specific populations.   Standards of Medical Care in Diabetes - 2016.  For the purpose of screening for the presence of diabetes:  <5.7%     Consistent with the absence of diabetes  5.7-6.4%  Consistent with increasing risk for diabetes   (prediabetes)  >or=6.5%  Consistent with diabetes  Currently no consensus exists for use of hemoglobin A1C  for diagnosis of diabetes for children.       Patient cont. To diet & exercise. May refer to endocrinology & dietition for further recommendations once stabilized.

## 2020-11-13 PROBLEM — M66.372: Status: ACTIVE | Noted: 2020-11-13

## 2020-11-14 PROBLEM — I10 HYPERTENSION: Status: ACTIVE | Noted: 2020-11-14

## 2020-11-14 PROBLEM — G40.909 SEIZURE DISORDER: Status: ACTIVE | Noted: 2020-11-14

## 2020-11-14 PROBLEM — E78.5 HYPERLIPIDEMIA: Status: ACTIVE | Noted: 2020-11-14

## 2020-11-14 PROBLEM — E11.9 TYPE II DIABETES MELLITUS: Status: ACTIVE | Noted: 2017-02-18

## 2020-11-14 PROBLEM — G62.9 PERIPHERAL NEUROPATHY: Status: ACTIVE | Noted: 2020-09-28

## 2020-11-16 PROBLEM — L08.9 FOOT INFECTION: Status: ACTIVE | Noted: 2020-11-16

## 2020-11-17 RX ORDER — CIPROFLOXACIN 750 MG/1
750 TABLET, FILM COATED ORAL EVERY 12 HOURS
Qty: 42 TABLET | Refills: 0 | Status: SHIPPED | OUTPATIENT
Start: 2020-11-17 | End: 2020-11-25 | Stop reason: SDUPTHER

## 2020-11-17 NOTE — PROGRESS NOTES
Contains abnormal data Aerobic culture  Order: [id number]  Status:  Final result   Visible to patient:  No (not released) Next appt:  11/25/2020 at 09:30 AM in Podiatry (Irene Tripathi DPM)  Specimen Information: Foot, Left; Wound        Component 4d ago   Aerobic Bacterial Culture Abnormal   PSEUDOMONAS AERUGINOSA   Rare     Aerobic Bacterial Culture Abnormal   ENTEROBACTER CLOACAE   Few   Skin mara also present     Resulting Agency [facility]   Susceptibility     Pseudomonas aeruginosa Enterobacter cloacae     CULTURE, AEROBIC  (SPECIFY SOURCE) CULTURE, AEROBIC  (SPECIFY SOURCE)     Amikacin <=16 mcg/mL Sensitive       Cefepime <=2 mcg/mL Sensitive <=2 mcg/mL Sensitive     Ceftriaxone   <=1 mcg/mL Sensitive     Ciprofloxacin <=1 mcg/mL Sensitive <=1 mcg/mL Sensitive     Ertapenem   <=0.5 mcg/mL Sensitive     Gentamicin <=4 mcg/mL Sensitive <=4 mcg/mL Sensitive     Levofloxacin   <=2 mcg/mL Sensitive     Meropenem <=1 mcg/mL Sensitive <=1 mcg/mL Sensitive     Piperacillin/Tazo <=16 mcg/mL Sensitive <=16 mcg/mL Sensitive     Tetracycline   <=4 mcg/mL Sensitive     Tobramycin <=4 mcg/mL Sensitive <=4 mcg/mL Sensitive     Trimeth/Sulfa   <=2/38 mcg/mL Sensitive              Linear View         Specimen Collected: 11/13/20 10:07 Last Resulted: 11/17/20 08:41               Given nature of infection, including Charcot fracture dislocation & exposed bone, Abx coverage for both organisms incl.Cipro - will prescribe 4 wks.

## 2020-11-18 ENCOUNTER — PATIENT OUTREACH (OUTPATIENT)
Dept: ADMINISTRATIVE | Facility: CLINIC | Age: 45
End: 2020-11-18

## 2020-11-18 DIAGNOSIS — E11.610 CHARCOT FOOT DUE TO DIABETES MELLITUS: ICD-10-CM

## 2020-11-18 DIAGNOSIS — Z48.89 ENCOUNTER FOR OTHER SPECIFIED SURGICAL AFTERCARE: Primary | ICD-10-CM

## 2020-11-18 NOTE — PROGRESS NOTES
Patient states that she was supposed to receive a knee scooter but was told her insurance did not cover and she may have to pay, but someone would contact her. Patient states that she spoke with the insurance company and there is a company that the insurance will cover. Patient is requesting a new prescription for the knee scooter to be sent to the company. Message sent to podiatrist, Dr Tripathi

## 2020-11-18 NOTE — PATIENT INSTRUCTIONS
Discharge Instructions for Cellulitis  You have been diagnosed with cellulitis. This is an infection in the deepest layer of the skin. In some cases, the infection also affects the muscle. Cellulitis is caused by bacteria. The bacteria can enter the body through broken skin. This can happen with a cut, scratch, animal bite, or an insect bite that has been scratched. You may have been treated in the hospital with antibiotics and fluids. You will likely be given a prescription for antibiotics to take at home. This sheet will help you take care of yourself at home.  Home care  When you are home:  · Take the prescribed antibiotic medicine you are given as directed until it is gone. Take it even if you feel better. It treats the infection and stops it from returning. Not taking all the medicine can make future infections hard to treat.  · Keep the infected area clean.  · When possible, raise the infected area above the level of your heart. This helps keep swelling down.  · Talk with your healthcare provider if you are in pain. Ask what kind of over-the-counter medicine you can take for pain.  · Apply clean bandages as advised.  · Take your temperature once a day for a week.  · Wash your hands often to prevent spreading the infection.  In the future, wash your hands before and after you touch cuts, scratches, or bandages. This will help prevent infection.   When to call your healthcare provider  Call your healthcare provider immediately if you have any of the following:  · Difficulty or pain when moving the joints above or below the infected area  · Discharge or pus draining from the area  · Fever of 100.4°F (38°C) or higher, or as directed by your healthcare provider  · Pain that gets worse in or around the infected   · Redness that gets worse in or around the infected area, particularly if the area of redness expands to a wider area  · Shaking chills  · Swelling of the infected area  · Vomiting   Date Last Reviewed:  8/1/2016  © 3091-0777 [employer]. [address] [place] [address], [place] [address]. All rights reserved. This information is not intended as a substitute for professional medical care. Always follow your healthcare professional's instructions.

## 2020-11-25 ENCOUNTER — OFFICE VISIT (OUTPATIENT)
Dept: PODIATRY | Facility: CLINIC | Age: 45
End: 2020-11-25
Payer: MEDICAID

## 2020-11-25 VITALS
HEART RATE: 82 BPM | WEIGHT: 200 LBS | HEIGHT: 67 IN | BODY MASS INDEX: 31.39 KG/M2 | SYSTOLIC BLOOD PRESSURE: 103 MMHG | DIASTOLIC BLOOD PRESSURE: 71 MMHG

## 2020-11-25 DIAGNOSIS — Z09 POSTOP CHECK: Primary | ICD-10-CM

## 2020-11-25 DIAGNOSIS — A49.8 PSEUDOMONAS AERUGINOSA INFECTION: ICD-10-CM

## 2020-11-25 DIAGNOSIS — L02.91 ABSCESS: ICD-10-CM

## 2020-11-25 DIAGNOSIS — M71.072 ABSCESS OF BURSA OF LEFT FOOT: ICD-10-CM

## 2020-11-25 DIAGNOSIS — E11.610 TYPE 2 DIABETES MELLITUS WITH CHARCOT'S JOINT OF LEFT FOOT: ICD-10-CM

## 2020-11-25 DIAGNOSIS — A49.8 INFECTION DUE TO ENTEROBACTERIACEAE: ICD-10-CM

## 2020-11-25 PROCEDURE — 99213 OFFICE O/P EST LOW 20 MIN: CPT | Mod: PBBFAC,PN | Performed by: PODIATRIST

## 2020-11-25 PROCEDURE — 99024 PR POST-OP FOLLOW-UP VISIT: ICD-10-PCS | Mod: ,,, | Performed by: PODIATRIST

## 2020-11-25 PROCEDURE — 99999 PR PBB SHADOW E&M-EST. PATIENT-LVL III: ICD-10-PCS | Mod: PBBFAC,,, | Performed by: PODIATRIST

## 2020-11-25 PROCEDURE — 99999 PR PBB SHADOW E&M-EST. PATIENT-LVL III: CPT | Mod: PBBFAC,,, | Performed by: PODIATRIST

## 2020-11-25 PROCEDURE — 99024 POSTOP FOLLOW-UP VISIT: CPT | Mod: ,,, | Performed by: PODIATRIST

## 2020-11-25 RX ORDER — CIPROFLOXACIN 750 MG/1
750 TABLET, FILM COATED ORAL EVERY 12 HOURS
Qty: 42 TABLET | Refills: 0 | Status: ON HOLD | OUTPATIENT
Start: 2020-11-25 | End: 2020-12-18 | Stop reason: HOSPADM

## 2020-11-25 NOTE — PROGRESS NOTES
Subjective:      Patient ID: Rebecca Olvera is a 45 y.o. female.    Chief Complaint: Post-op Evaluation    Rebecca Olvera returns to the clinic today for her 1st post-operative examination of left foot I&D; DOS 11/16/20. Rebecca Olvera rates pain at a 2/10 on visual analog scale.  However, she is having some anxiety in regard to her general circumstance and is accompanied by his sister in this regard.  Has been compliant in limited activity and use of boot to offload the midfoot area as recommended.      Objective:    General    Constitutional: She is oriented to person, place, and time. She appears well-developed and well-nourished. No distress.   Cardiovascular: Intact distal pulses.    Neurological: She is alert and oriented to person, place, and time.   Psychiatric: She has a normal mood and affect. Her behavior is normal.     General Musculoskeletal Exam   Gait: abnormal     Left Ankle/Foot Exam     Inspection  Deformity: present  Erythema: absent  Bruising: Foot - absent    Pain   The patient exhibits pain of the plantar arch.    Alignment   Midfoot Alignment: flexible planus    Other   Foot Crepitus:  absent  Sensation: decreased    Comments:  There is no TTP to direct palpation plantar ft nor any calor nor edema.  Incision maintained well with sutures intact.  Minimal maceration.  Viable skin edges.  No active drainage nor exudate.  No malodor is noted.      Vascular Exam       Left Pulses  Dorsalis Pedis:      2+            XR FOOT COMPLETE 3 VIEW LEFT  Narrative: EXAMINATION:  XR FOOT COMPLETE 3 VIEW LEFT    CLINICAL HISTORY:  infection;.  Other acute osteomyelitis, left ankle and foot    TECHNIQUE:  AP, lateral and oblique views of the left foot were performed.    COMPARISON:  11/11/2020    FINDINGS:  There is osseous destruction of the tarsal bones which could be due to infection or neuropathic joint.  No acute fracture seen, no osseous lesion seen, there is significant soft tissue swelling about  the foot region.  No soft tissue air noted on today's exam.  No radiopaque foreign body seen.  There is new subtle cortical irregularity of the distal aspect of the cuboid compared to the prior exam possibly ongoing osteomyelitis or neuropathic changes.  Impression: Interval development of subtle cortical irregularity distal aspect of the cuboid compared to the prior exam.  Could represent on going changes of osteomyelitis or progression of neuropathic changes.    Electronically signed by: Yoselin Dennis MD  Date:    11/25/2020  Time:    11:02   I independently reviewed the images as compared to preop - resolution of soft tissue gas foci midfoot is noted.  No obvious change in position of the various tarsals and metatarsal bases including the fractures and dislocation, as compared with previous x-rays.    Assessment:       Encounter Diagnoses   Name Primary?    Postop check Yes    Abscess of bursa of left foot     Type 2 diabetes mellitus with Charcot's joint of left foot     Infection due to Enterobacteriaceae     Pseudomonas aeruginosa infection     Abscess          Plan:       Rebecca was seen today for post-op evaluation.    Diagnoses and all orders for this visit:    Postop check  -     ciprofloxacin HCl (CIPRO) 750 MG tablet; Take 1 tablet (750 mg total) by mouth every 12 (twelve) hours. for 21 days    Abscess of bursa of left foot  -     ciprofloxacin HCl (CIPRO) 750 MG tablet; Take 1 tablet (750 mg total) by mouth every 12 (twelve) hours. for 21 days    Type 2 diabetes mellitus with Charcot's joint of left foot    Infection due to Enterobacteriaceae  -     ciprofloxacin HCl (CIPRO) 750 MG tablet; Take 1 tablet (750 mg total) by mouth every 12 (twelve) hours. for 21 days    Pseudomonas aeruginosa infection  -     ciprofloxacin HCl (CIPRO) 750 MG tablet; Take 1 tablet (750 mg total) by mouth every 12 (twelve) hours. for 21 days    Abscess    Follow-up: Rebecca Olvera to follow up in 2 weeks for  possible suture removal; sooner p.r.n.. If there are any questions prior to this, the patient was instructed to contact the office.    We will also follow up with regard to her referral for Charcot reconstruction with [facility].

## 2020-11-25 NOTE — PATIENT INSTRUCTIONS
Abscess (Incision & Drainage)  An abscess is sometimes called a boil. It happens when bacteria get trapped under the skin and start to grow. Pus forms inside the abscess as the body responds to the bacteria. An abscess can happen with an insect bite, ingrown hair, blocked oil gland, pimple, cyst, or puncture wound.  Your healthcare provider has drained the pus from your abscess. If the abscess pocket was large, your healthcare provider may have put in gauze packing. Your provider will need to remove it on your next visit. He or she may also replace it at that time. You may not need antibiotics to treat a simple abscess, unless the infection is spreading into the skin around the wound (cellulitis).  The wound will take about 1 to 2 weeks to heal, depending on the size of the abscess. Healthy tissue will grow from the bottom and sides of the opening until it seals over.  Home care  These tips can help your wound heal:  · The wound may drain for the first 2 days. Cover the wound with a clean dry dressing. Change the dressing if it becomes soaked with blood or pus.  · If a gauze packing was placed inside the abscess pocket, you may be told to remove it yourself. You may do this in the shower. Once the packing is removed, you should wash the area in the shower, or clean the area as directed by your provider. Continue to do this until the skin opening has closed. Make sure you wash your hands after changing the packing or cleaning the wound.  · If you were prescribed antibiotics, take them as directed until they are all gone.  · You may use acetaminophen or ibuprofen to control pain, unless another pain medicine was prescribed. If you have liver disease or ever had a stomach ulcer, talk with your doctor before using these medicines.  Follow-up care  Follow up with your healthcare provider, or as advised. If a gauze packing was put in your wound, it should be removed in 1 to 2 days. Check your wound every day for any  signs that the infection is getting worse. The signs are listed below.  When to seek medical advice  Call your healthcare provider right away if any of these occur:  · Increasing redness or swelling  · Red streaks in the skin leading away from the wound  · Increasing local pain or swelling  · Continued pus draining from the wound 2 days after treatment  · Fever of 100.4ºF (38ºC) or higher, or as directed by your healthcare provider  · Boil returns when you are at home  Date Last Reviewed: 9/1/2016  © 7758-6417 [employer]. [address] [place] [address]. All rights reserved. This information is not intended as a substitute for professional medical care. Always follow your healthcare professional's instructions.

## 2020-12-02 ENCOUNTER — OFFICE VISIT (OUTPATIENT)
Dept: PODIATRY | Facility: CLINIC | Age: 45
End: 2020-12-02
Payer: MEDICAID

## 2020-12-02 VITALS — SYSTOLIC BLOOD PRESSURE: 93 MMHG | HEART RATE: 90 BPM | DIASTOLIC BLOOD PRESSURE: 64 MMHG

## 2020-12-02 DIAGNOSIS — E11.610 TYPE 2 DIABETES MELLITUS WITH CHARCOT'S JOINT OF LEFT FOOT: ICD-10-CM

## 2020-12-02 DIAGNOSIS — M71.072 ABSCESS OF BURSA OF LEFT FOOT: Primary | ICD-10-CM

## 2020-12-02 DIAGNOSIS — Z09 POSTOP CHECK: ICD-10-CM

## 2020-12-02 PROCEDURE — 99024 PR POST-OP FOLLOW-UP VISIT: ICD-10-PCS | Mod: ,,, | Performed by: PODIATRIST

## 2020-12-02 PROCEDURE — 99999 PR PBB SHADOW E&M-EST. PATIENT-LVL III: ICD-10-PCS | Mod: PBBFAC,,, | Performed by: PODIATRIST

## 2020-12-02 PROCEDURE — 99024 POSTOP FOLLOW-UP VISIT: CPT | Mod: ,,, | Performed by: PODIATRIST

## 2020-12-02 PROCEDURE — 99213 OFFICE O/P EST LOW 20 MIN: CPT | Mod: PBBFAC,PN | Performed by: PODIATRIST

## 2020-12-02 PROCEDURE — 99999 PR PBB SHADOW E&M-EST. PATIENT-LVL III: CPT | Mod: PBBFAC,,, | Performed by: PODIATRIST

## 2020-12-03 DIAGNOSIS — S92.202D: ICD-10-CM

## 2020-12-03 DIAGNOSIS — S93.315D DISLOCATION OF TARSAL JOINT OF LEFT FOOT, SUBSEQUENT ENCOUNTER: ICD-10-CM

## 2020-12-03 DIAGNOSIS — S93.325D CLOSED DISLOCATION OF TARSOMETATARSAL JOINT OF LEFT FOOT, SUBSEQUENT ENCOUNTER: ICD-10-CM

## 2020-12-03 DIAGNOSIS — E11.610 TYPE 2 DIABETES MELLITUS WITH CHARCOT'S JOINT OF LEFT FOOT: Primary | ICD-10-CM

## 2020-12-03 NOTE — PROGRESS NOTES
Dr.Sarah Barboza has recommended Dr.Meredith Manzanares @ [facility] for possible Charcot reconstruction.

## 2020-12-03 NOTE — PROGRESS NOTES
Subjective:      Rebecca Olvera is a 45 y.o. female who presents for evaluation of a foot ulcer. The patient has had an ulcer of her left plantar foot for 1 month.  This has been treated in the hospital with I&D of abscess & she is here for her 2nd POV &  dressing change. She is accompanied by her sister as she has significant anxiety. States that she has some pain lateral midfoot only. Past history significant for diabetes and significant midfoot fracture dislocations related to neuroarthropathy.. The patient reports pain, but denies fever.    The following portions of the patient's history were reviewed and updated as appropriate: allergies, current medications, past family history, past medical history, past social history, past surgical history and problem list.     Review of Systems  Constitutional: positive for anxiety, negative for chills, fatigue, fevers and malaise  Cardiovascular: positive for lower extremity edema, negative for claudication  Musculoskeletal:positive for arthralgias, negative for bone pain and muscle weakness  Neurological: positive for gait problems, seizures and numbness, negative for paresthesia and weakness  Behavioral/Psych: positive for anxiety, negative for fatigue      Objective:      BP 93/64 (BP Location: Right arm, Patient Position: Sitting, BP Method: Medium (Automatic))   Pulse 90   LMP  (LMP Unknown)     General:  alert, appears stated age, cooperative, mild distress and moderately obese               Pulse exam:: DP palpable left +1; CFT WNL   Neurologic:  plantar sensation diminished; TTP lateral midfoot left     Lower extremity ulcer located on left plantar foot resolved  Sutures intact Maceration central 1/2 incision line w/o exudate nor drainage expressed to compression; no fluctuance.   Odor none   Exudate:   none   Erythema:   none   Edema:  mild          Assessment/Plan:     Diabetic foot ulcer resolved without signs of infection, s/p excisionaI debridement w/D  abscess plantar midfoot. DOS 11/16/20  Complicated by: neuropathy, Charcot neuroarthropathy w/fracture/dislocation of midfoot left.          Dressing change including Betadine, 4x4s, Kerlix, fluffs laterally, Savi & Coban.  Cont.in CAM walker (partially WB prn; otherwise,  NWB left w/ walker or wheelchair)  Follow-up in 7-10 days for possible suture removal (& debridement prn).      Referral to  forwarded per recommendation to [facility] Dr. Randi Manzanares for possible Charcot reconstruction.

## 2020-12-16 ENCOUNTER — OFFICE VISIT (OUTPATIENT)
Dept: PODIATRY | Facility: CLINIC | Age: 45
End: 2020-12-16
Payer: MEDICAID

## 2020-12-16 VITALS
HEIGHT: 67 IN | WEIGHT: 222.19 LBS | BODY MASS INDEX: 34.87 KG/M2 | SYSTOLIC BLOOD PRESSURE: 106 MMHG | HEART RATE: 91 BPM | DIASTOLIC BLOOD PRESSURE: 68 MMHG

## 2020-12-16 DIAGNOSIS — T81.32XA POSTOPERATIVE DEHISCENCE OF INTERNAL WOUND, INITIAL ENCOUNTER: Primary | ICD-10-CM

## 2020-12-16 DIAGNOSIS — S93.315D DISLOCATION OF TARSAL JOINT OF LEFT FOOT, SUBSEQUENT ENCOUNTER: ICD-10-CM

## 2020-12-16 DIAGNOSIS — R68.83 CHILLS WITHOUT FEVER: ICD-10-CM

## 2020-12-16 DIAGNOSIS — Z98.890 STATUS POST LEFT FOOT SURGERY: ICD-10-CM

## 2020-12-16 DIAGNOSIS — L03.119 CELLULITIS AND ABSCESS OF FOOT: ICD-10-CM

## 2020-12-16 DIAGNOSIS — S93.325D CLOSED DISLOCATION OF TARSOMETATARSAL JOINT OF LEFT FOOT, SUBSEQUENT ENCOUNTER: ICD-10-CM

## 2020-12-16 DIAGNOSIS — S92.202D: ICD-10-CM

## 2020-12-16 DIAGNOSIS — E13.610: ICD-10-CM

## 2020-12-16 DIAGNOSIS — L02.619 CELLULITIS AND ABSCESS OF FOOT: ICD-10-CM

## 2020-12-16 PROBLEM — E11.610 TYPE 2 DIABETES MELLITUS WITH CHARCOT'S JOINT OF LEFT FOOT: Status: RESOLVED | Noted: 2020-11-25 | Resolved: 2020-12-16

## 2020-12-16 PROBLEM — T81.329A: Status: ACTIVE | Noted: 2020-12-16

## 2020-12-16 PROBLEM — E11.628 DIABETIC FOOT INFECTION: Status: ACTIVE | Noted: 2020-12-16

## 2020-12-16 PROBLEM — L08.9 DIABETIC FOOT INFECTION: Status: ACTIVE | Noted: 2020-12-16

## 2020-12-16 PROCEDURE — 99499 UNLISTED E&M SERVICE: CPT | Mod: S$PBB,,, | Performed by: PODIATRIST

## 2020-12-16 PROCEDURE — 99999 PR PBB SHADOW E&M-EST. PATIENT-LVL V: CPT | Mod: PBBFAC,,, | Performed by: PODIATRIST

## 2020-12-16 PROCEDURE — 99024 PR POST-OP FOLLOW-UP VISIT: ICD-10-PCS | Mod: ,,, | Performed by: PODIATRIST

## 2020-12-16 PROCEDURE — 99024 POSTOP FOLLOW-UP VISIT: CPT | Mod: ,,, | Performed by: PODIATRIST

## 2020-12-16 PROCEDURE — 99499 NO LOS: ICD-10-PCS | Mod: S$PBB,,, | Performed by: PODIATRIST

## 2020-12-16 PROCEDURE — 99215 OFFICE O/P EST HI 40 MIN: CPT | Mod: PBBFAC,PN | Performed by: PODIATRIST

## 2020-12-16 PROCEDURE — 99999 PR PBB SHADOW E&M-EST. PATIENT-LVL V: ICD-10-PCS | Mod: PBBFAC,,, | Performed by: PODIATRIST

## 2020-12-16 PROCEDURE — 11043 DBRDMT MUSC&/FSCA 1ST 20/<: CPT | Mod: PBBFAC,PN | Performed by: PODIATRIST

## 2020-12-16 RX ORDER — METFORMIN HYDROCHLORIDE 1000 MG/1
1000 TABLET ORAL
Status: ON HOLD | COMMUNITY
End: 2020-12-18 | Stop reason: HOSPADM

## 2020-12-16 RX ORDER — CLINDAMYCIN HYDROCHLORIDE 300 MG/1
300 CAPSULE ORAL
Status: ON HOLD | COMMUNITY
End: 2020-12-18 | Stop reason: HOSPADM

## 2020-12-16 RX ORDER — LEVOTHYROXINE SODIUM 75 UG/1
75 TABLET ORAL DAILY
Status: ON HOLD | COMMUNITY
Start: 2020-11-23 | End: 2020-12-18 | Stop reason: HOSPADM

## 2020-12-16 RX ORDER — GLIPIZIDE 2.5 MG/1
2.5 TABLET, EXTENDED RELEASE ORAL
Status: ON HOLD | COMMUNITY
Start: 2020-11-23 | End: 2020-12-18 | Stop reason: HOSPADM

## 2020-12-16 RX ORDER — INSULIN GLARGINE 100 [IU]/ML
22 INJECTION, SOLUTION SUBCUTANEOUS
Status: ON HOLD | COMMUNITY
Start: 2020-11-23 | End: 2020-12-18 | Stop reason: HOSPADM

## 2020-12-16 RX ORDER — LEVOTHYROXINE SODIUM 75 UG/1
75 TABLET ORAL
COMMUNITY
Start: 2020-11-23 | End: 2021-09-24

## 2020-12-16 RX ORDER — HYDROCODONE BITARTRATE AND ACETAMINOPHEN 5; 325 MG/1; MG/1
1 TABLET ORAL EVERY 6 HOURS PRN
Status: ON HOLD | COMMUNITY
Start: 2020-11-25 | End: 2021-09-24

## 2020-12-16 NOTE — PROCEDURES
Wound Debridement plantar left ft dehiscence    Date/Time: 12/16/2020 1:30 PM  Performed by: Irene Tripathi DPM  Authorized by: Irene Tripathi DPM     Consent Done?:  Yes (Verbal)    Wound Details:    Location:  Left foot    Location:  Left Plantar    Type of Debridement:  Excisional       Length (cm):  4.4       Area (sq cm):  6.6       Width (cm):  1.5       Percent Debrided (%):  100       Depth (cm):  1       Total Area Debrided (sq cm):  6.6    Depth of debridement:  Muscle/fascia/tendon    Tissue debrided:  Dermis, Epidermis, Ligament and Subcutaneous    Devitalized tissue debrided:  Callus and Fibrin    Instruments:  Blade    Bleeding:  Minimal  Hemostasis Achieved: Yes    Method Used:  Pressure  Patient tolerance:  Patient tolerated the procedure well with no immediate complications

## 2020-12-16 NOTE — PROGRESS NOTES
Subjective:      Rebecca Olvera is a 45 y.o. female who presents for evaluation of left foot wound. The patient has had an ulcer of her left plantar foot for a couple of months.  This has been treated in the hospital with I&D of abscess & she is here for her &  dressing change. She is accompanied by her sister as she has significant anxiety. States that she has continued to have some pain lateral midfoot only.  Her sister states that she has been having on of chills since the last time that she was in the hospital for same, about 5 weeks ago; she underwent I and D and debridement of plantar left foot on 11/13/2020 and delayed primary closure and 11/16/2020.  They are concerned because of malodor.  Also, she has fallen twice since I last saw her on 12/02/2020 including a couple days ago and over the weekend.  Past history significant for diabetes and significant midfoot fractures/dislocations related to Charcot neuroarthropathy. The patient reports pain, intermittent chills but denies fever.  Is not currently on antibiotics that she had completed as prescribed.    The following portions of the patient's history were reviewed and updated as appropriate: allergies, current medications, past family history, past medical history, past social history, past surgical history and problem list.     Objective:      BP 93/64 (BP Location: Right arm, Patient Position: Sitting, BP Method: Medium (Automatic))   Pulse 90   LMP  (LMP Unknown)     General:  alert, appears stated age, cooperative, mild distress and moderately obese               Pulse exam:: DP palpable left +1; CFT WNL   Neurologic:  plantar sensation diminished; TTP lateral midfoot left     Lower extremity ulcer located on left plantar foot  Sutures completely pull through Maceration involving the entire incision line w/o exudate nor drainage expressed to compression; no fluctuance.  Wound itself granuloma but dehiscence full-thickness greater than a cm  laterally and the wound itself measures 4.4 cm in length by 1 and half cm across,  after debridement and a cm depth   Odor Malodor due to moisture; no purulence   Exudate:   none   Erythema:   none   Edema:  mild          Assessment/Plan:     Full-thickness surgical wound dehiscence s/p excisionaI debridement and incision and drainage abscess plantar midfoot. DOS 11/16/20  Complicated by: Charcot neuroarthropathy w/fracture/dislocation of midfoot left.          Dressing change including Betadine, 4x4s, Kerlix, Savi & Coban.  Cont.in CAM walker NWB left w/ walker or wheelchair    Patient to be admitted hospital for IV antibiotics.  Also, will taken back to the OR and do a repeat debridement and excision of wound as well as application of a wound VAC.  Hospitalist contacted in and willing to accept patient, Dr. MENDOZA.  Patient will go over to the ED for rapid COVID prior to admission.

## 2020-12-18 PROBLEM — Z71.3 ENCOUNTER FOR DIETARY CONSULTATION: Chronic | Status: ACTIVE | Noted: 2020-12-18

## 2020-12-19 PROCEDURE — G0180 PR HOME HEALTH MD CERTIFICATION: ICD-10-PCS | Mod: ,,, | Performed by: PODIATRIST

## 2020-12-19 PROCEDURE — G0180 MD CERTIFICATION HHA PATIENT: HCPCS | Mod: ,,, | Performed by: PODIATRIST

## 2020-12-21 ENCOUNTER — PATIENT MESSAGE (OUTPATIENT)
Dept: PODIATRY | Facility: CLINIC | Age: 45
End: 2020-12-21

## 2020-12-21 DIAGNOSIS — A49.8 ENTEROCOCCUS FAECALIS INFECTION: Primary | ICD-10-CM

## 2020-12-21 RX ORDER — AMOXICILLIN AND CLAVULANATE POTASSIUM 875; 125 MG/1; MG/1
1 TABLET, FILM COATED ORAL EVERY 12 HOURS
Qty: 28 TABLET | Refills: 0 | Status: SHIPPED | OUTPATIENT
Start: 2020-12-21 | End: 2021-01-04

## 2020-12-22 ENCOUNTER — TELEPHONE (OUTPATIENT)
Dept: PODIATRY | Facility: CLINIC | Age: 45
End: 2020-12-22

## 2020-12-23 DIAGNOSIS — A49.8 BACTERIAL INFECTION DUE TO BACTEROIDES FRAGILIS: Primary | ICD-10-CM

## 2020-12-23 RX ORDER — METRONIDAZOLE 250 MG/1
750 TABLET ORAL EVERY 8 HOURS
Qty: 126 TABLET | Refills: 0 | Status: SHIPPED | OUTPATIENT
Start: 2020-12-23 | End: 2021-01-06

## 2020-12-31 ENCOUNTER — OFFICE VISIT (OUTPATIENT)
Dept: PODIATRY | Facility: CLINIC | Age: 45
End: 2020-12-31
Payer: MEDICAID

## 2020-12-31 VITALS — HEIGHT: 67 IN | SYSTOLIC BLOOD PRESSURE: 105 MMHG | BODY MASS INDEX: 34.94 KG/M2 | DIASTOLIC BLOOD PRESSURE: 70 MMHG

## 2020-12-31 DIAGNOSIS — E11.610 TYPE 2 DIABETES MELLITUS WITH CHARCOT'S JOINT OF LEFT FOOT: ICD-10-CM

## 2020-12-31 DIAGNOSIS — L89.894 PRESSURE ULCER OF LEFT FOOT, STAGE 4: ICD-10-CM

## 2020-12-31 DIAGNOSIS — Z98.890 STATUS POST LEFT FOOT SURGERY: Primary | ICD-10-CM

## 2020-12-31 DIAGNOSIS — T81.32XA POSTOPERATIVE DEHISCENCE OF INTERNAL WOUND, INITIAL ENCOUNTER: ICD-10-CM

## 2020-12-31 PROCEDURE — 99024 PR POST-OP FOLLOW-UP VISIT: ICD-10-PCS | Mod: ,,, | Performed by: PODIATRIST

## 2020-12-31 PROCEDURE — 99999 PR PBB SHADOW E&M-EST. PATIENT-LVL II: ICD-10-PCS | Mod: PBBFAC,,, | Performed by: PODIATRIST

## 2020-12-31 PROCEDURE — 99999 PR PBB SHADOW E&M-EST. PATIENT-LVL II: CPT | Mod: PBBFAC,,, | Performed by: PODIATRIST

## 2020-12-31 PROCEDURE — 97605 PR NEG PRESS WOUND THERAPY (NPWT) W/NON-DISPOSABLE WOUND VAC DEVICE (DME), <=50 CM: ICD-10-PCS | Mod: S$PBB,,, | Performed by: PODIATRIST

## 2020-12-31 PROCEDURE — 97605 NEG PRS WND THER DME<=50SQCM: CPT | Mod: PBBFAC,PN | Performed by: PODIATRIST

## 2020-12-31 PROCEDURE — 99024 POSTOP FOLLOW-UP VISIT: CPT | Mod: ,,, | Performed by: PODIATRIST

## 2020-12-31 PROCEDURE — 97605 NEG PRS WND THER DME<=50SQCM: CPT | Mod: S$PBB,,, | Performed by: PODIATRIST

## 2020-12-31 PROCEDURE — 99212 OFFICE O/P EST SF 10 MIN: CPT | Mod: PBBFAC,PN,25 | Performed by: PODIATRIST

## 2021-01-15 ENCOUNTER — OFFICE VISIT (OUTPATIENT)
Dept: PODIATRY | Facility: CLINIC | Age: 46
End: 2021-01-15
Payer: MEDICAID

## 2021-01-15 VITALS — HEART RATE: 84 BPM | DIASTOLIC BLOOD PRESSURE: 74 MMHG | SYSTOLIC BLOOD PRESSURE: 103 MMHG

## 2021-01-15 DIAGNOSIS — L97.526 ULCER OF LEFT FOOT WITH BONE INVOLVEMENT WITHOUT EVIDENCE OF NECROSIS: ICD-10-CM

## 2021-01-15 DIAGNOSIS — Z48.89 ENCOUNTER FOR OTHER SPECIFIED SURGICAL AFTERCARE: ICD-10-CM

## 2021-01-15 DIAGNOSIS — M71.072 ABSCESS OF BURSA OF LEFT FOOT: ICD-10-CM

## 2021-01-15 DIAGNOSIS — E11.610 TYPE 2 DIABETES MELLITUS WITH CHARCOT'S JOINT OF LEFT FOOT: Primary | ICD-10-CM

## 2021-01-15 DIAGNOSIS — R52 PAIN ASSOCIATED WITH WOUND: ICD-10-CM

## 2021-01-15 DIAGNOSIS — M1A.0720 CHRONIC GOUT OF LEFT FOOT, UNSPECIFIED CAUSE: ICD-10-CM

## 2021-01-15 DIAGNOSIS — T14.8XXA PAIN ASSOCIATED WITH WOUND: ICD-10-CM

## 2021-01-15 PROCEDURE — 97605 NEG PRS WND THER DME<=50SQCM: CPT | Mod: PBBFAC,PN,59 | Performed by: PODIATRIST

## 2021-01-15 PROCEDURE — 97605 PR NEG PRESS WOUND THERAPY (NPWT) W/NON-DISPOSABLE WOUND VAC DEVICE (DME), <=50 CM: ICD-10-PCS | Mod: S$PBB,,, | Performed by: PODIATRIST

## 2021-01-15 PROCEDURE — 97597 DBRDMT OPN WND 1ST 20 CM/<: CPT | Mod: PBBFAC,PN | Performed by: PODIATRIST

## 2021-01-15 PROCEDURE — 99213 OFFICE O/P EST LOW 20 MIN: CPT | Mod: 25,S$PBB,, | Performed by: PODIATRIST

## 2021-01-15 PROCEDURE — 97605 NEG PRS WND THER DME<=50SQCM: CPT | Mod: S$PBB,,, | Performed by: PODIATRIST

## 2021-01-15 PROCEDURE — 99214 OFFICE O/P EST MOD 30 MIN: CPT | Mod: PBBFAC,PN | Performed by: PODIATRIST

## 2021-01-15 PROCEDURE — 99213 PR OFFICE/OUTPT VISIT, EST, LEVL III, 20-29 MIN: ICD-10-PCS | Mod: 25,S$PBB,, | Performed by: PODIATRIST

## 2021-01-15 PROCEDURE — 99999 PR PBB SHADOW E&M-EST. PATIENT-LVL IV: ICD-10-PCS | Mod: PBBFAC,,, | Performed by: PODIATRIST

## 2021-01-15 PROCEDURE — 99024 POSTOP FOLLOW-UP VISIT: CPT | Mod: ,,, | Performed by: PODIATRIST

## 2021-01-15 PROCEDURE — 99024 PR POST-OP FOLLOW-UP VISIT: ICD-10-PCS | Mod: ,,, | Performed by: PODIATRIST

## 2021-01-15 PROCEDURE — 99999 PR PBB SHADOW E&M-EST. PATIENT-LVL IV: CPT | Mod: PBBFAC,,, | Performed by: PODIATRIST

## 2021-01-15 RX ORDER — NABUMETONE 750 MG/1
1500 TABLET, FILM COATED ORAL DAILY
Qty: 42 TABLET | Refills: 1 | Status: ON HOLD | OUTPATIENT
Start: 2021-01-15 | End: 2021-09-24

## 2021-01-20 ENCOUNTER — EXTERNAL HOME HEALTH (OUTPATIENT)
Dept: HOME HEALTH SERVICES | Facility: HOSPITAL | Age: 46
End: 2021-01-20
Payer: MEDICAID

## 2021-02-04 ENCOUNTER — OFFICE VISIT (OUTPATIENT)
Dept: PODIATRY | Facility: CLINIC | Age: 46
End: 2021-02-04
Payer: MEDICAID

## 2021-02-04 VITALS
HEIGHT: 67 IN | HEART RATE: 78 BPM | BODY MASS INDEX: 35.11 KG/M2 | SYSTOLIC BLOOD PRESSURE: 105 MMHG | WEIGHT: 223.69 LBS | DIASTOLIC BLOOD PRESSURE: 68 MMHG

## 2021-02-04 DIAGNOSIS — M71.072 ABSCESS OF BURSA OF LEFT FOOT: ICD-10-CM

## 2021-02-04 DIAGNOSIS — E11.42 DM TYPE 2 WITH DIABETIC PERIPHERAL NEUROPATHY: ICD-10-CM

## 2021-02-04 DIAGNOSIS — E11.610 DIABETIC NEUROPATHIC ARTHROPATHY: ICD-10-CM

## 2021-02-04 DIAGNOSIS — B35.1 ONYCHOMYCOSIS DUE TO DERMATOPHYTE: ICD-10-CM

## 2021-02-04 DIAGNOSIS — Q84.5 ENLARGED AND HYPERTROPHIC NAILS: ICD-10-CM

## 2021-02-04 DIAGNOSIS — S91.302D OPEN WOUND OF LEFT FOOT, SUBSEQUENT ENCOUNTER: Primary | ICD-10-CM

## 2021-02-04 DIAGNOSIS — T81.32XA POSTOPERATIVE DEHISCENCE OF INTERNAL WOUND, INITIAL ENCOUNTER: ICD-10-CM

## 2021-02-04 DIAGNOSIS — E11.43 TYPE 2 DIABETES MELLITUS WITH DIABETIC AUTONOMIC NEUROPATHY, WITHOUT LONG-TERM CURRENT USE OF INSULIN: ICD-10-CM

## 2021-02-04 DIAGNOSIS — E11.610 TYPE 2 DIABETES MELLITUS WITH CHARCOT'S JOINT OF LEFT FOOT: ICD-10-CM

## 2021-02-04 PROCEDURE — 99999 PR PBB SHADOW E&M-EST. PATIENT-LVL V: CPT | Mod: PBBFAC,,, | Performed by: PODIATRIST

## 2021-02-04 PROCEDURE — 99499 NO LOS: ICD-10-PCS | Mod: S$PBB,,, | Performed by: PODIATRIST

## 2021-02-04 PROCEDURE — 97597 DBRDMT OPN WND 1ST 20 CM/<: CPT | Mod: PBBFAC,PN,59 | Performed by: PODIATRIST

## 2021-02-04 PROCEDURE — 11719: ICD-10-PCS | Mod: 79,S$PBB,, | Performed by: PODIATRIST

## 2021-02-04 PROCEDURE — 11719 TRIM NAIL(S) ANY NUMBER: CPT | Mod: Q9,PBBFAC,PN | Performed by: PODIATRIST

## 2021-02-04 PROCEDURE — 99999 PR PBB SHADOW E&M-EST. PATIENT-LVL V: ICD-10-PCS | Mod: PBBFAC,,, | Performed by: PODIATRIST

## 2021-02-04 PROCEDURE — 11720 PR DEBRIDEMENT OF NAIL(S), 1-5: ICD-10-PCS | Mod: 79,59,S$PBB, | Performed by: PODIATRIST

## 2021-02-04 PROCEDURE — 99499 UNLISTED E&M SERVICE: CPT | Mod: S$PBB,,, | Performed by: PODIATRIST

## 2021-02-04 PROCEDURE — 99024 PR POST-OP FOLLOW-UP VISIT: ICD-10-PCS | Mod: ,,, | Performed by: PODIATRIST

## 2021-02-04 PROCEDURE — 11720 DEBRIDE NAIL 1-5: CPT | Mod: Q9,PBBFAC,PN | Performed by: PODIATRIST

## 2021-02-04 PROCEDURE — 97605 PR NEG PRESS WOUND THERAPY (NPWT) W/NON-DISPOSABLE WOUND VAC DEVICE (DME), <=50 CM: ICD-10-PCS | Mod: 59,S$PBB,, | Performed by: PODIATRIST

## 2021-02-04 PROCEDURE — 97605 NEG PRS WND THER DME<=50SQCM: CPT | Mod: PBBFAC,PN,59 | Performed by: PODIATRIST

## 2021-02-04 PROCEDURE — 99024 POSTOP FOLLOW-UP VISIT: CPT | Mod: ,,, | Performed by: PODIATRIST

## 2021-02-04 PROCEDURE — 97605 NEG PRS WND THER DME<=50SQCM: CPT | Mod: 59,S$PBB,, | Performed by: PODIATRIST

## 2021-02-04 PROCEDURE — 11720 DEBRIDE NAIL 1-5: CPT | Mod: 79,59,S$PBB, | Performed by: PODIATRIST

## 2021-02-04 PROCEDURE — 99215 OFFICE O/P EST HI 40 MIN: CPT | Mod: PBBFAC,PN | Performed by: PODIATRIST

## 2021-02-14 PROBLEM — B35.1 ONYCHOMYCOSIS DUE TO DERMATOPHYTE: Status: ACTIVE | Noted: 2021-02-14

## 2021-02-14 PROBLEM — Q84.5 ENLARGED AND HYPERTROPHIC NAILS: Status: ACTIVE | Noted: 2021-02-14

## 2021-02-14 PROBLEM — S91.302A OPEN WOUND OF LEFT FOOT: Status: ACTIVE | Noted: 2021-02-14

## 2021-02-17 ENCOUNTER — OFFICE VISIT (OUTPATIENT)
Dept: PODIATRY | Facility: CLINIC | Age: 46
End: 2021-02-17
Payer: MEDICAID

## 2021-02-17 VITALS
BODY MASS INDEX: 34.94 KG/M2 | SYSTOLIC BLOOD PRESSURE: 121 MMHG | HEART RATE: 71 BPM | DIASTOLIC BLOOD PRESSURE: 79 MMHG | WEIGHT: 223.13 LBS

## 2021-02-17 DIAGNOSIS — E11.610 CHARCOT FOOT DUE TO DIABETES MELLITUS: ICD-10-CM

## 2021-02-17 DIAGNOSIS — T81.32XD DEHISCENCE OF INTERNAL SURGICAL WOUND, SUBSEQUENT ENCOUNTER: ICD-10-CM

## 2021-02-17 DIAGNOSIS — M71.072 ABSCESS OF BURSA OF LEFT FOOT: ICD-10-CM

## 2021-02-17 DIAGNOSIS — E11.43 TYPE 2 DIABETES MELLITUS WITH DIABETIC AUTONOMIC NEUROPATHY, WITHOUT LONG-TERM CURRENT USE OF INSULIN: Primary | ICD-10-CM

## 2021-02-17 PROCEDURE — 99214 OFFICE O/P EST MOD 30 MIN: CPT | Mod: PBBFAC,PN | Performed by: PODIATRIST

## 2021-02-17 PROCEDURE — 97605 NEG PRS WND THER DME<=50SQCM: CPT | Mod: S$PBB,,, | Performed by: PODIATRIST

## 2021-02-17 PROCEDURE — 97597 DBRDMT OPN WND 1ST 20 CM/<: CPT | Mod: PBBFAC,PN | Performed by: PODIATRIST

## 2021-02-17 PROCEDURE — 97605 PR NEG PRESS WOUND THERAPY (NPWT) W/NON-DISPOSABLE WOUND VAC DEVICE (DME), <=50 CM: ICD-10-PCS | Mod: S$PBB,,, | Performed by: PODIATRIST

## 2021-02-17 PROCEDURE — 99213 OFFICE O/P EST LOW 20 MIN: CPT | Mod: 24,25,S$PBB, | Performed by: PODIATRIST

## 2021-02-17 PROCEDURE — 99213 PR OFFICE/OUTPT VISIT, EST, LEVL III, 20-29 MIN: ICD-10-PCS | Mod: 24,25,S$PBB, | Performed by: PODIATRIST

## 2021-02-17 PROCEDURE — 99499 UNLISTED E&M SERVICE: CPT | Mod: S$PBB,,, | Performed by: PODIATRIST

## 2021-02-17 PROCEDURE — 99999 PR PBB SHADOW E&M-EST. PATIENT-LVL IV: ICD-10-PCS | Mod: PBBFAC,,, | Performed by: PODIATRIST

## 2021-02-17 PROCEDURE — 99499: ICD-10-PCS | Mod: S$PBB,,, | Performed by: PODIATRIST

## 2021-02-17 PROCEDURE — 97605 NEG PRS WND THER DME<=50SQCM: CPT | Mod: PBBFAC,PN,59 | Performed by: PODIATRIST

## 2021-02-17 PROCEDURE — 99999 PR PBB SHADOW E&M-EST. PATIENT-LVL IV: CPT | Mod: PBBFAC,,, | Performed by: PODIATRIST

## 2021-03-01 ENCOUNTER — TELEPHONE (OUTPATIENT)
Dept: PODIATRY | Facility: CLINIC | Age: 46
End: 2021-03-01

## 2021-03-01 DIAGNOSIS — R52 PAIN ASSOCIATED WITH WOUND: ICD-10-CM

## 2021-03-01 DIAGNOSIS — T14.8XXA PAIN ASSOCIATED WITH WOUND: ICD-10-CM

## 2021-03-01 DIAGNOSIS — M79.2 NEUROGENIC PAIN OF LEFT FOOT: Primary | ICD-10-CM

## 2021-03-01 PROBLEM — Z09 POSTOP CHECK: Status: RESOLVED | Noted: 2020-11-25 | Resolved: 2021-03-01

## 2021-03-01 RX ORDER — TRAMADOL HYDROCHLORIDE AND ACETAMINOPHEN 37.5; 325 MG/1; MG/1
1 TABLET, FILM COATED ORAL EVERY 8 HOURS PRN
Qty: 30 TABLET | Refills: 0 | Status: SHIPPED | OUTPATIENT
Start: 2021-03-01

## 2021-03-11 ENCOUNTER — OFFICE VISIT (OUTPATIENT)
Dept: PODIATRY | Facility: CLINIC | Age: 46
End: 2021-03-11
Payer: MEDICAID

## 2021-03-11 VITALS
WEIGHT: 223.13 LBS | HEART RATE: 90 BPM | DIASTOLIC BLOOD PRESSURE: 79 MMHG | BODY MASS INDEX: 35.02 KG/M2 | SYSTOLIC BLOOD PRESSURE: 118 MMHG | HEIGHT: 67 IN

## 2021-03-11 DIAGNOSIS — S91.302D OPEN WOUND OF LEFT FOOT, SUBSEQUENT ENCOUNTER: Primary | ICD-10-CM

## 2021-03-11 DIAGNOSIS — E11.42 DM TYPE 2 WITH DIABETIC PERIPHERAL NEUROPATHY: ICD-10-CM

## 2021-03-11 DIAGNOSIS — E11.610 DIABETIC NEUROPATHIC ARTHROPATHY: ICD-10-CM

## 2021-03-11 DIAGNOSIS — E11.610 CHARCOT FOOT DUE TO DIABETES MELLITUS: ICD-10-CM

## 2021-03-11 PROCEDURE — 97597 DBRDMT OPN WND 1ST 20 CM/<: CPT | Mod: PBBFAC,PN | Performed by: PODIATRIST

## 2021-03-11 PROCEDURE — 99999 PR PBB SHADOW E&M-EST. PATIENT-LVL IV: ICD-10-PCS | Mod: PBBFAC,,, | Performed by: PODIATRIST

## 2021-03-11 PROCEDURE — 97605 PR NEG PRESS WOUND THERAPY (NPWT) W/NON-DISPOSABLE WOUND VAC DEVICE (DME), <=50 CM: ICD-10-PCS | Mod: 59,S$PBB,, | Performed by: PODIATRIST

## 2021-03-11 PROCEDURE — 97605 NEG PRS WND THER DME<=50SQCM: CPT | Mod: PBBFAC,PN,59 | Performed by: PODIATRIST

## 2021-03-11 PROCEDURE — 97597 WOUND DEBRIDEMENT L FOOT: ICD-10-PCS | Mod: S$PBB,,, | Performed by: PODIATRIST

## 2021-03-11 PROCEDURE — 99214 PR OFFICE/OUTPT VISIT, EST, LEVL IV, 30-39 MIN: ICD-10-PCS | Mod: 25,S$PBB,, | Performed by: PODIATRIST

## 2021-03-11 PROCEDURE — 99214 OFFICE O/P EST MOD 30 MIN: CPT | Mod: PBBFAC,PN,25 | Performed by: PODIATRIST

## 2021-03-11 PROCEDURE — 99999 PR PBB SHADOW E&M-EST. PATIENT-LVL IV: CPT | Mod: PBBFAC,,, | Performed by: PODIATRIST

## 2021-03-11 PROCEDURE — 99214 OFFICE O/P EST MOD 30 MIN: CPT | Mod: 25,S$PBB,, | Performed by: PODIATRIST

## 2021-03-11 PROCEDURE — 97605 NEG PRS WND THER DME<=50SQCM: CPT | Mod: 59,S$PBB,, | Performed by: PODIATRIST

## 2021-04-14 ENCOUNTER — OFFICE VISIT (OUTPATIENT)
Dept: PODIATRY | Facility: CLINIC | Age: 46
End: 2021-04-14
Payer: MEDICAID

## 2021-04-14 VITALS
WEIGHT: 223.69 LBS | SYSTOLIC BLOOD PRESSURE: 148 MMHG | HEART RATE: 71 BPM | HEIGHT: 67 IN | BODY MASS INDEX: 35.11 KG/M2 | DIASTOLIC BLOOD PRESSURE: 85 MMHG

## 2021-04-14 DIAGNOSIS — T81.32XD DEHISCENCE OF INTERNAL SURGICAL WOUND, SUBSEQUENT ENCOUNTER: ICD-10-CM

## 2021-04-14 DIAGNOSIS — M14.672 CHARCOT'S JOINT OF FOOT, LEFT: ICD-10-CM

## 2021-04-14 DIAGNOSIS — E11.610 TYPE 2 DIABETES MELLITUS WITH CHARCOT'S JOINT OF LEFT FOOT: Primary | ICD-10-CM

## 2021-04-14 DIAGNOSIS — L97.526 ULCER OF LEFT FOOT WITH BONE INVOLVEMENT WITHOUT EVIDENCE OF NECROSIS: ICD-10-CM

## 2021-04-14 PROCEDURE — 99999 PR PBB SHADOW E&M-EST. PATIENT-LVL V: ICD-10-PCS | Mod: PBBFAC,,, | Performed by: PODIATRIST

## 2021-04-14 PROCEDURE — 97597 DBRDMT OPN WND 1ST 20 CM/<: CPT | Mod: PBBFAC,PN | Performed by: PODIATRIST

## 2021-04-14 PROCEDURE — 99214 OFFICE O/P EST MOD 30 MIN: CPT | Mod: 25,S$PBB,, | Performed by: PODIATRIST

## 2021-04-14 PROCEDURE — 99215 OFFICE O/P EST HI 40 MIN: CPT | Mod: PBBFAC,PN,25 | Performed by: PODIATRIST

## 2021-04-14 PROCEDURE — 97597 WOUND DEBRIDEMENT: ICD-10-PCS | Mod: S$PBB,,, | Performed by: PODIATRIST

## 2021-04-14 PROCEDURE — 99999 PR PBB SHADOW E&M-EST. PATIENT-LVL V: CPT | Mod: PBBFAC,,, | Performed by: PODIATRIST

## 2021-04-14 PROCEDURE — 99214 PR OFFICE/OUTPT VISIT, EST, LEVL IV, 30-39 MIN: ICD-10-PCS | Mod: 25,S$PBB,, | Performed by: PODIATRIST

## 2021-05-12 ENCOUNTER — OFFICE VISIT (OUTPATIENT)
Dept: PODIATRY | Facility: CLINIC | Age: 46
End: 2021-05-12
Payer: MEDICAID

## 2021-05-12 VITALS
WEIGHT: 245.81 LBS | DIASTOLIC BLOOD PRESSURE: 81 MMHG | BODY MASS INDEX: 38.58 KG/M2 | HEART RATE: 81 BPM | HEIGHT: 67 IN | SYSTOLIC BLOOD PRESSURE: 123 MMHG

## 2021-05-12 DIAGNOSIS — E11.610 TYPE 2 DIABETES MELLITUS WITH CHARCOT'S JOINT OF LEFT FOOT: Primary | ICD-10-CM

## 2021-05-12 DIAGNOSIS — S92.202S: ICD-10-CM

## 2021-05-12 DIAGNOSIS — S91.302D OPEN WOUND OF LEFT FOOT, SUBSEQUENT ENCOUNTER: ICD-10-CM

## 2021-05-12 DIAGNOSIS — E11.610 DIABETIC NEUROPATHIC ARTHROPATHY: ICD-10-CM

## 2021-05-12 PROCEDURE — 99999 PR PBB SHADOW E&M-EST. PATIENT-LVL II: CPT | Mod: PBBFAC,,, | Performed by: PODIATRIST

## 2021-05-12 PROCEDURE — 99214 OFFICE O/P EST MOD 30 MIN: CPT | Mod: 25,S$PBB,, | Performed by: PODIATRIST

## 2021-05-12 PROCEDURE — 99214 PR OFFICE/OUTPT VISIT, EST, LEVL IV, 30-39 MIN: ICD-10-PCS | Mod: 25,S$PBB,, | Performed by: PODIATRIST

## 2021-05-12 PROCEDURE — 99999 PR PBB SHADOW E&M-EST. PATIENT-LVL II: ICD-10-PCS | Mod: PBBFAC,,, | Performed by: PODIATRIST

## 2021-05-12 PROCEDURE — 99212 OFFICE O/P EST SF 10 MIN: CPT | Mod: PBBFAC,PN,25 | Performed by: PODIATRIST

## 2021-05-12 PROCEDURE — 97597 DBRDMT OPN WND 1ST 20 CM/<: CPT | Mod: PBBFAC,PN | Performed by: PODIATRIST

## 2021-05-12 PROCEDURE — 97597: ICD-10-PCS | Mod: S$PBB,,, | Performed by: PODIATRIST

## 2021-06-02 ENCOUNTER — OFFICE VISIT (OUTPATIENT)
Dept: PODIATRY | Facility: CLINIC | Age: 46
End: 2021-06-02
Payer: MEDICAID

## 2021-06-02 VITALS
BODY MASS INDEX: 39.11 KG/M2 | HEIGHT: 67 IN | SYSTOLIC BLOOD PRESSURE: 125 MMHG | WEIGHT: 249.19 LBS | DIASTOLIC BLOOD PRESSURE: 82 MMHG | HEART RATE: 90 BPM

## 2021-06-02 DIAGNOSIS — E11.610 DIABETIC NEUROPATHIC ARTHROPATHY: ICD-10-CM

## 2021-06-02 DIAGNOSIS — S91.302D OPEN WOUND OF LEFT FOOT, SUBSEQUENT ENCOUNTER: Primary | ICD-10-CM

## 2021-06-02 DIAGNOSIS — L89.893 PRESSURE ULCER OF LEFT FOOT, STAGE 3: ICD-10-CM

## 2021-06-02 DIAGNOSIS — E11.610 CHARCOT FOOT DUE TO DIABETES MELLITUS: ICD-10-CM

## 2021-06-02 DIAGNOSIS — S92.202S: ICD-10-CM

## 2021-06-02 DIAGNOSIS — S93.315S: ICD-10-CM

## 2021-06-02 PROCEDURE — 99999 PR PBB SHADOW E&M-EST. PATIENT-LVL IV: ICD-10-PCS | Mod: PBBFAC,,, | Performed by: PODIATRIST

## 2021-06-02 PROCEDURE — 99999 PR PBB SHADOW E&M-EST. PATIENT-LVL IV: CPT | Mod: PBBFAC,,, | Performed by: PODIATRIST

## 2021-06-02 PROCEDURE — 99499 NO LOS: ICD-10-PCS | Mod: S$PBB,,, | Performed by: PODIATRIST

## 2021-06-02 PROCEDURE — 99214 OFFICE O/P EST MOD 30 MIN: CPT | Mod: PBBFAC,PN,25 | Performed by: PODIATRIST

## 2021-06-02 PROCEDURE — 11042 WOUND DEBRIDEMENT L: ICD-10-PCS | Mod: S$PBB,,, | Performed by: PODIATRIST

## 2021-06-02 PROCEDURE — 11042 DBRDMT SUBQ TIS 1ST 20SQCM/<: CPT | Mod: PBBFAC,PN | Performed by: PODIATRIST

## 2021-06-02 PROCEDURE — 99499 UNLISTED E&M SERVICE: CPT | Mod: S$PBB,,, | Performed by: PODIATRIST

## 2021-06-16 ENCOUNTER — OFFICE VISIT (OUTPATIENT)
Dept: PODIATRY | Facility: CLINIC | Age: 46
End: 2021-06-16
Payer: MEDICAID

## 2021-06-16 VITALS
HEART RATE: 105 BPM | SYSTOLIC BLOOD PRESSURE: 127 MMHG | WEIGHT: 247.5 LBS | DIASTOLIC BLOOD PRESSURE: 73 MMHG | BODY MASS INDEX: 38.76 KG/M2

## 2021-06-16 DIAGNOSIS — S91.302D OPEN WOUND OF LEFT FOOT, SUBSEQUENT ENCOUNTER: ICD-10-CM

## 2021-06-16 DIAGNOSIS — L89.893 PRESSURE ULCER OF LEFT FOOT, STAGE 3: ICD-10-CM

## 2021-06-16 DIAGNOSIS — E11.610 TYPE 2 DIABETES MELLITUS WITH CHARCOT'S JOINT OF LEFT FOOT: Primary | ICD-10-CM

## 2021-06-16 PROCEDURE — 99213 OFFICE O/P EST LOW 20 MIN: CPT | Mod: PBBFAC,PN | Performed by: PODIATRIST

## 2021-06-16 PROCEDURE — 99499 UNLISTED E&M SERVICE: CPT | Mod: S$PBB,,, | Performed by: PODIATRIST

## 2021-06-16 PROCEDURE — 99999 PR PBB SHADOW E&M-EST. PATIENT-LVL III: ICD-10-PCS | Mod: PBBFAC,,, | Performed by: PODIATRIST

## 2021-06-16 PROCEDURE — 97597: ICD-10-PCS | Mod: S$PBB,,, | Performed by: PODIATRIST

## 2021-06-16 PROCEDURE — 99999 PR PBB SHADOW E&M-EST. PATIENT-LVL III: CPT | Mod: PBBFAC,,, | Performed by: PODIATRIST

## 2021-06-16 PROCEDURE — 99499 NO LOS: ICD-10-PCS | Mod: S$PBB,,, | Performed by: PODIATRIST

## 2021-06-16 PROCEDURE — 97597 DBRDMT OPN WND 1ST 20 CM/<: CPT | Mod: PBBFAC,PN | Performed by: PODIATRIST

## 2021-07-08 ENCOUNTER — OFFICE VISIT (OUTPATIENT)
Dept: PODIATRY | Facility: CLINIC | Age: 46
End: 2021-07-08
Payer: MEDICAID

## 2021-07-08 VITALS
BODY MASS INDEX: 38.03 KG/M2 | DIASTOLIC BLOOD PRESSURE: 81 MMHG | SYSTOLIC BLOOD PRESSURE: 125 MMHG | HEART RATE: 88 BPM | WEIGHT: 242.81 LBS

## 2021-07-08 DIAGNOSIS — E11.610 DIABETIC NEUROPATHIC ARTHROPATHY: Primary | ICD-10-CM

## 2021-07-08 DIAGNOSIS — S91.302D OPEN WOUND OF LEFT FOOT, SUBSEQUENT ENCOUNTER: ICD-10-CM

## 2021-07-08 DIAGNOSIS — E11.610 TYPE 2 DIABETES MELLITUS WITH CHARCOT'S JOINT OF LEFT FOOT: ICD-10-CM

## 2021-07-08 DIAGNOSIS — E11.42 DM TYPE 2 WITH DIABETIC PERIPHERAL NEUROPATHY: ICD-10-CM

## 2021-07-08 DIAGNOSIS — L89.893 PRESSURE ULCER OF LEFT FOOT, STAGE 3: ICD-10-CM

## 2021-07-08 PROCEDURE — 99999 PR PBB SHADOW E&M-EST. PATIENT-LVL IV: ICD-10-PCS | Mod: PBBFAC,,, | Performed by: PODIATRIST

## 2021-07-08 PROCEDURE — 11042 DBRDMT SUBQ TIS 1ST 20SQCM/<: CPT | Mod: PBBFAC,PN | Performed by: PODIATRIST

## 2021-07-08 PROCEDURE — 99214 OFFICE O/P EST MOD 30 MIN: CPT | Mod: PBBFAC,PN,25 | Performed by: PODIATRIST

## 2021-07-08 PROCEDURE — 99213 OFFICE O/P EST LOW 20 MIN: CPT | Mod: 25,S$PBB,, | Performed by: PODIATRIST

## 2021-07-08 PROCEDURE — 11042 WOUND DEBRIDEMENT: ICD-10-PCS | Mod: S$PBB,,, | Performed by: PODIATRIST

## 2021-07-08 PROCEDURE — 99999 PR PBB SHADOW E&M-EST. PATIENT-LVL IV: CPT | Mod: PBBFAC,,, | Performed by: PODIATRIST

## 2021-07-08 PROCEDURE — 99213 PR OFFICE/OUTPT VISIT, EST, LEVL III, 20-29 MIN: ICD-10-PCS | Mod: 25,S$PBB,, | Performed by: PODIATRIST

## 2021-07-22 ENCOUNTER — OFFICE VISIT (OUTPATIENT)
Dept: PODIATRY | Facility: CLINIC | Age: 46
End: 2021-07-22
Payer: MEDICAID

## 2021-07-22 VITALS
DIASTOLIC BLOOD PRESSURE: 67 MMHG | SYSTOLIC BLOOD PRESSURE: 112 MMHG | WEIGHT: 242.81 LBS | BODY MASS INDEX: 38.03 KG/M2 | HEART RATE: 102 BPM

## 2021-07-22 DIAGNOSIS — S91.302D OPEN WOUND OF LEFT FOOT, SUBSEQUENT ENCOUNTER: Primary | ICD-10-CM

## 2021-07-22 DIAGNOSIS — E11.610 DIABETIC NEUROPATHIC ARTHROPATHY: ICD-10-CM

## 2021-07-22 DIAGNOSIS — E11.610 CHARCOT FOOT DUE TO DIABETES MELLITUS: ICD-10-CM

## 2021-07-22 DIAGNOSIS — E11.43 TYPE 2 DIABETES MELLITUS WITH DIABETIC AUTONOMIC NEUROPATHY, WITHOUT LONG-TERM CURRENT USE OF INSULIN: ICD-10-CM

## 2021-07-22 PROCEDURE — 11042 DBRDMT SUBQ TIS 1ST 20SQCM/<: CPT | Mod: PBBFAC,PN | Performed by: PODIATRIST

## 2021-07-22 PROCEDURE — 99214 OFFICE O/P EST MOD 30 MIN: CPT | Mod: PBBFAC,PN | Performed by: PODIATRIST

## 2021-07-22 PROCEDURE — 99213 OFFICE O/P EST LOW 20 MIN: CPT | Mod: 25,S$PBB,, | Performed by: PODIATRIST

## 2021-07-22 PROCEDURE — 11042 WOUND DEBRIDEMENT L: ICD-10-PCS | Mod: S$PBB,,, | Performed by: PODIATRIST

## 2021-07-22 PROCEDURE — 99999 PR PBB SHADOW E&M-EST. PATIENT-LVL IV: ICD-10-PCS | Mod: PBBFAC,,, | Performed by: PODIATRIST

## 2021-07-22 PROCEDURE — 99213 PR OFFICE/OUTPT VISIT, EST, LEVL III, 20-29 MIN: ICD-10-PCS | Mod: 25,S$PBB,, | Performed by: PODIATRIST

## 2021-07-22 PROCEDURE — 99999 PR PBB SHADOW E&M-EST. PATIENT-LVL IV: CPT | Mod: PBBFAC,,, | Performed by: PODIATRIST

## 2021-08-17 ENCOUNTER — OFFICE VISIT (OUTPATIENT)
Dept: PODIATRY | Facility: CLINIC | Age: 46
End: 2021-08-17
Payer: MEDICAID

## 2021-08-17 VITALS
HEIGHT: 66 IN | WEIGHT: 233 LBS | DIASTOLIC BLOOD PRESSURE: 68 MMHG | SYSTOLIC BLOOD PRESSURE: 104 MMHG | BODY MASS INDEX: 37.45 KG/M2 | TEMPERATURE: 98 F | HEART RATE: 92 BPM

## 2021-08-17 DIAGNOSIS — L97.526 ULCER OF LEFT FOOT WITH BONE INVOLVEMENT WITHOUT EVIDENCE OF NECROSIS: Primary | ICD-10-CM

## 2021-08-17 DIAGNOSIS — E11.610 DIABETIC NEUROPATHIC ARTHROPATHY: ICD-10-CM

## 2021-08-17 DIAGNOSIS — E11.610 TYPE 2 DIABETES MELLITUS WITH CHARCOT'S JOINT OF LEFT FOOT: ICD-10-CM

## 2021-08-17 PROCEDURE — 99213 PR OFFICE/OUTPT VISIT, EST, LEVL III, 20-29 MIN: ICD-10-PCS | Mod: 25,S$PBB,, | Performed by: PODIATRIST

## 2021-08-17 PROCEDURE — 99999 PR PBB SHADOW E&M-EST. PATIENT-LVL IV: CPT | Mod: PBBFAC,,, | Performed by: PODIATRIST

## 2021-08-17 PROCEDURE — 99999 PR PBB SHADOW E&M-EST. PATIENT-LVL IV: ICD-10-PCS | Mod: PBBFAC,,, | Performed by: PODIATRIST

## 2021-08-17 PROCEDURE — 99214 OFFICE O/P EST MOD 30 MIN: CPT | Mod: PBBFAC,PN | Performed by: PODIATRIST

## 2021-08-17 PROCEDURE — 11044: ICD-10-PCS | Mod: S$PBB,,, | Performed by: PODIATRIST

## 2021-08-17 PROCEDURE — 11044 DBRDMT BONE 1ST 20 SQ CM/<: CPT | Mod: PBBFAC,PN | Performed by: PODIATRIST

## 2021-08-17 PROCEDURE — 99213 OFFICE O/P EST LOW 20 MIN: CPT | Mod: 25,S$PBB,, | Performed by: PODIATRIST

## 2021-09-17 ENCOUNTER — TELEPHONE (OUTPATIENT)
Dept: PODIATRY | Facility: CLINIC | Age: 46
End: 2021-09-17

## 2021-09-21 ENCOUNTER — TELEPHONE (OUTPATIENT)
Dept: PODIATRY | Facility: CLINIC | Age: 46
End: 2021-09-21

## 2021-09-21 ENCOUNTER — OFFICE VISIT (OUTPATIENT)
Dept: PODIATRY | Facility: CLINIC | Age: 46
End: 2021-09-21
Payer: MEDICAID

## 2021-09-21 VITALS
DIASTOLIC BLOOD PRESSURE: 67 MMHG | SYSTOLIC BLOOD PRESSURE: 113 MMHG | TEMPERATURE: 98 F | HEART RATE: 99 BPM | WEIGHT: 230 LBS | BODY MASS INDEX: 37.12 KG/M2

## 2021-09-21 DIAGNOSIS — E11.610 TYPE 2 DIABETES MELLITUS WITH CHARCOT'S JOINT OF LEFT FOOT: Primary | ICD-10-CM

## 2021-09-21 DIAGNOSIS — S91.302D OPEN WOUND OF LEFT FOOT, SUBSEQUENT ENCOUNTER: ICD-10-CM

## 2021-09-21 DIAGNOSIS — M71.072 ABSCESS OF BURSA OF LEFT FOOT: ICD-10-CM

## 2021-09-21 PROCEDURE — 99999 PR PBB SHADOW E&M-EST. PATIENT-LVL V: ICD-10-PCS | Mod: PBBFAC,,, | Performed by: PODIATRIST

## 2021-09-21 PROCEDURE — 99214 PR OFFICE/OUTPT VISIT, EST, LEVL IV, 30-39 MIN: ICD-10-PCS | Mod: S$PBB,,, | Performed by: PODIATRIST

## 2021-09-21 PROCEDURE — 99215 OFFICE O/P EST HI 40 MIN: CPT | Mod: PBBFAC,PN | Performed by: PODIATRIST

## 2021-09-21 PROCEDURE — 99214 OFFICE O/P EST MOD 30 MIN: CPT | Mod: S$PBB,,, | Performed by: PODIATRIST

## 2021-09-21 PROCEDURE — 99999 PR PBB SHADOW E&M-EST. PATIENT-LVL V: CPT | Mod: PBBFAC,,, | Performed by: PODIATRIST

## 2021-09-21 RX ORDER — CLINDAMYCIN PHOSPHATE 900 MG/50ML
900 INJECTION, SOLUTION INTRAVENOUS
Status: DISCONTINUED | OUTPATIENT
Start: 2021-09-24 | End: 2021-09-24 | Stop reason: CLARIF

## 2021-09-21 RX ORDER — CYCLOBENZAPRINE HCL 10 MG
10 TABLET ORAL 3 TIMES DAILY PRN
Qty: 30 TABLET | Refills: 1 | Status: SHIPPED | OUTPATIENT
Start: 2021-09-21 | End: 2021-10-01

## 2021-09-21 RX ORDER — KETOROLAC TROMETHAMINE 30 MG/ML
30 INJECTION, SOLUTION INTRAMUSCULAR; INTRAVENOUS ONCE
Status: DISCONTINUED | OUTPATIENT
Start: 2021-09-24 | End: 2021-09-24 | Stop reason: CLARIF

## 2021-09-21 RX ORDER — SODIUM CHLORIDE 0.9 % (FLUSH) 0.9 %
10 SYRINGE (ML) INJECTION
Status: DISCONTINUED | OUTPATIENT
Start: 2021-09-24 | End: 2021-09-24 | Stop reason: CLARIF

## 2021-09-28 ENCOUNTER — TELEPHONE (OUTPATIENT)
Dept: PODIATRY | Facility: CLINIC | Age: 46
End: 2021-09-28

## 2021-10-04 ENCOUNTER — TELEPHONE (OUTPATIENT)
Dept: PODIATRY | Facility: CLINIC | Age: 46
End: 2021-10-04

## 2021-10-04 DIAGNOSIS — Z09 POSTOP CHECK: Primary | ICD-10-CM

## 2022-01-14 ENCOUNTER — TELEPHONE (OUTPATIENT)
Dept: PODIATRY | Facility: CLINIC | Age: 47
End: 2022-01-14
Payer: MEDICAID
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
## 2022-07-15 ENCOUNTER — HOSPITAL ENCOUNTER (EMERGENCY)
Facility: OTHER | Age: 47
Discharge: HOME | End: 2022-07-15
Attending: EMERGENCY MEDICINE
Payer: MEDICAID

## 2022-07-15 VITALS
HEIGHT: 66 IN | DIASTOLIC BLOOD PRESSURE: 64 MMHG | OXYGEN SATURATION: 100 % | BODY MASS INDEX: 32.77 KG/M2 | TEMPERATURE: 99 F | RESPIRATION RATE: 18 BRPM | SYSTOLIC BLOOD PRESSURE: 127 MMHG | HEART RATE: 100 BPM

## 2022-07-15 DIAGNOSIS — R45.851 SUICIDAL IDEATIONS: Primary | ICD-10-CM

## 2022-07-15 DIAGNOSIS — F43.0 ACUTE STRESS REACTION: ICD-10-CM

## 2022-07-15 DIAGNOSIS — F32.A DEPRESSION, UNSPECIFIED DEPRESSION TYPE: ICD-10-CM

## 2022-07-15 LAB
ALBUMIN SERPL BCP-MCNC: 3.8 G/DL (ref 3.5–5.2)
ALP SERPL-CCNC: 110 U/L (ref 55–135)
ALT SERPL W/O P-5'-P-CCNC: 12 U/L (ref 10–44)
AMPHET+METHAMPHET UR QL: NEGATIVE
ANION GAP SERPL CALC-SCNC: 9 MMOL/L (ref 8–16)
APAP SERPL-MCNC: <3 UG/ML (ref 10–20)
AST SERPL-CCNC: 12 U/L (ref 10–40)
B-HCG UR QL: NEGATIVE
BACTERIA #/AREA URNS HPF: ABNORMAL /HPF
BARBITURATES UR QL SCN>200 NG/ML: NEGATIVE
BASOPHILS # BLD AUTO: 0.06 K/UL (ref 0–0.2)
BASOPHILS NFR BLD: 0.8 % (ref 0–1.9)
BENZODIAZ UR QL SCN>200 NG/ML: NEGATIVE
BILIRUB SERPL-MCNC: 0.4 MG/DL (ref 0.1–1)
BILIRUB UR QL STRIP: NEGATIVE
BUN SERPL-MCNC: 21 MG/DL (ref 6–20)
BZE UR QL SCN: NEGATIVE
CALCIUM SERPL-MCNC: 10.6 MG/DL (ref 8.7–10.5)
CANNABINOIDS UR QL SCN: NEGATIVE
CHLORIDE SERPL-SCNC: 107 MMOL/L (ref 95–110)
CLARITY UR: CLEAR
CO2 SERPL-SCNC: 25 MMOL/L (ref 23–29)
COLOR UR: YELLOW
CREAT SERPL-MCNC: 1.3 MG/DL (ref 0.5–1.4)
CREAT UR-MCNC: 62.7 MG/DL (ref 15–325)
CTP QC/QA: YES
CTP QC/QA: YES
DIFFERENTIAL METHOD: ABNORMAL
EOSINOPHIL # BLD AUTO: 0.1 K/UL (ref 0–0.5)
EOSINOPHIL NFR BLD: 0.6 % (ref 0–8)
ERYTHROCYTE [DISTWIDTH] IN BLOOD BY AUTOMATED COUNT: 14.8 % (ref 11.5–14.5)
EST. GFR  (AFRICAN AMERICAN): 56 ML/MIN/1.73 M^2
EST. GFR  (NON AFRICAN AMERICAN): 49 ML/MIN/1.73 M^2
ETHANOL SERPL-MCNC: <10 MG/DL
GLUCOSE SERPL-MCNC: 189 MG/DL (ref 70–110)
GLUCOSE UR QL STRIP: ABNORMAL
HCT VFR BLD AUTO: 41.3 % (ref 37–48.5)
HCV AB SERPL QL IA: NEGATIVE
HGB BLD-MCNC: 14.1 G/DL (ref 12–16)
HGB UR QL STRIP: NEGATIVE
HIV 1+2 AB+HIV1 P24 AG SERPL QL IA: NEGATIVE
IMM GRANULOCYTES # BLD AUTO: 0.02 K/UL (ref 0–0.04)
IMM GRANULOCYTES NFR BLD AUTO: 0.3 % (ref 0–0.5)
KETONES UR QL STRIP: NEGATIVE
LEUKOCYTE ESTERASE UR QL STRIP: NEGATIVE
LYMPHOCYTES # BLD AUTO: 1.9 K/UL (ref 1–4.8)
LYMPHOCYTES NFR BLD: 24.2 % (ref 18–48)
MCH RBC QN AUTO: 29 PG (ref 27–31)
MCHC RBC AUTO-ENTMCNC: 34.1 G/DL (ref 32–36)
MCV RBC AUTO: 85 FL (ref 82–98)
METHADONE UR QL SCN>300 NG/ML: NEGATIVE
MICROSCOPIC COMMENT: ABNORMAL
MONOCYTES # BLD AUTO: 0.4 K/UL (ref 0.3–1)
MONOCYTES NFR BLD: 5.4 % (ref 4–15)
NEUTROPHILS # BLD AUTO: 5.5 K/UL (ref 1.8–7.7)
NEUTROPHILS NFR BLD: 68.7 % (ref 38–73)
NITRITE UR QL STRIP: POSITIVE
NRBC BLD-RTO: 0 /100 WBC
OPIATES UR QL SCN: NEGATIVE
PCP UR QL SCN>25 NG/ML: NEGATIVE
PH UR STRIP: 6 [PH] (ref 5–8)
PLATELET # BLD AUTO: 397 K/UL (ref 150–450)
PMV BLD AUTO: 10.2 FL (ref 9.2–12.9)
POTASSIUM SERPL-SCNC: 4.3 MMOL/L (ref 3.5–5.1)
PROT SERPL-MCNC: 8.1 G/DL (ref 6–8.4)
PROT UR QL STRIP: NEGATIVE
RBC # BLD AUTO: 4.86 M/UL (ref 4–5.4)
SARS-COV-2 RDRP RESP QL NAA+PROBE: NEGATIVE
SODIUM SERPL-SCNC: 141 MMOL/L (ref 136–145)
SP GR UR STRIP: 1.01 (ref 1–1.03)
TOXICOLOGY INFORMATION: NORMAL
TSH SERPL DL<=0.005 MIU/L-ACNC: 0.49 UIU/ML (ref 0.4–4)
URN SPEC COLLECT METH UR: ABNORMAL
UROBILINOGEN UR STRIP-ACNC: NEGATIVE EU/DL
WBC # BLD AUTO: 7.93 K/UL (ref 3.9–12.7)
WBC #/AREA URNS HPF: 8 /HPF (ref 0–5)
YEAST URNS QL MICRO: ABNORMAL

## 2022-07-15 PROCEDURE — 99204 OFFICE O/P NEW MOD 45 MIN: CPT | Mod: 95,AF,HB, | Performed by: PSYCHIATRY & NEUROLOGY

## 2022-07-15 PROCEDURE — 84443 ASSAY THYROID STIM HORMONE: CPT | Performed by: EMERGENCY MEDICINE

## 2022-07-15 PROCEDURE — 25000003 PHARM REV CODE 250: Performed by: EMERGENCY MEDICINE

## 2022-07-15 PROCEDURE — 80307 DRUG TEST PRSMV CHEM ANLYZR: CPT | Performed by: EMERGENCY MEDICINE

## 2022-07-15 PROCEDURE — 80053 COMPREHEN METABOLIC PANEL: CPT | Performed by: EMERGENCY MEDICINE

## 2022-07-15 PROCEDURE — U0002 COVID-19 LAB TEST NON-CDC: HCPCS | Performed by: EMERGENCY MEDICINE

## 2022-07-15 PROCEDURE — 86803 HEPATITIS C AB TEST: CPT | Performed by: EMERGENCY MEDICINE

## 2022-07-15 PROCEDURE — 81000 URINALYSIS NONAUTO W/SCOPE: CPT | Mod: 59 | Performed by: EMERGENCY MEDICINE

## 2022-07-15 PROCEDURE — 85025 COMPLETE CBC W/AUTO DIFF WBC: CPT | Performed by: EMERGENCY MEDICINE

## 2022-07-15 PROCEDURE — 80143 DRUG ASSAY ACETAMINOPHEN: CPT | Performed by: EMERGENCY MEDICINE

## 2022-07-15 PROCEDURE — 87389 HIV-1 AG W/HIV-1&-2 AB AG IA: CPT | Performed by: EMERGENCY MEDICINE

## 2022-07-15 PROCEDURE — 99204 PR OFFICE/OUTPT VISIT, NEW, LEVL IV, 45-59 MIN: ICD-10-PCS | Mod: 95,AF,HB, | Performed by: PSYCHIATRY & NEUROLOGY

## 2022-07-15 PROCEDURE — 82077 ASSAY SPEC XCP UR&BREATH IA: CPT | Performed by: EMERGENCY MEDICINE

## 2022-07-15 PROCEDURE — 99284 EMERGENCY DEPT VISIT MOD MDM: CPT | Mod: 25

## 2022-07-15 PROCEDURE — 81025 URINE PREGNANCY TEST: CPT | Performed by: EMERGENCY MEDICINE

## 2022-07-15 RX ORDER — LORAZEPAM 1 MG/1
2 TABLET ORAL
Status: COMPLETED | OUTPATIENT
Start: 2022-07-15 | End: 2022-07-15

## 2022-07-15 RX ORDER — LEVETIRACETAM 500 MG/1
500 TABLET ORAL
Status: COMPLETED | OUTPATIENT
Start: 2022-07-15 | End: 2022-07-15

## 2022-07-15 RX ORDER — ESCITALOPRAM OXALATE 5 MG/1
5 TABLET ORAL DAILY
Qty: 40 TABLET | Refills: 0 | Status: SHIPPED | OUTPATIENT
Start: 2022-07-15 | End: 2022-08-14

## 2022-07-15 RX ADMIN — LEVETIRACETAM 500 MG: 500 TABLET, FILM COATED ORAL at 09:07

## 2022-07-15 RX ADMIN — LORAZEPAM 2 MG: 1 TABLET ORAL at 08:07
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
## 2022-07-15 NOTE — ED NOTES
Pt awake and alert; resting quietly on stretcher. No acute distress or discomfort reported or observed.  Pt denies restroom needs at this time; is able to reposition self on stretcher. Bed locked in lowest position; side rails up and locked x 2. Room assessed for safety measures and cleanliness; no action needed at this time. Plan of care discussed. Pt instructed to alert nurse for assistance and before attempting to get out of bed; verbalizes understanding. Pt denies needs or complaints at this time; will continue to monitor. ED Placido paredes, at bedside.
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
## 2022-09-28 ENCOUNTER — TELEPHONE (OUTPATIENT)
Dept: ORTHOPEDICS | Facility: CLINIC | Age: 47
End: 2022-09-28
Payer: MEDICAID

## 2022-09-28 NOTE — TELEPHONE ENCOUNTER
Spoke with pt advised no new medicaid patients appointments are available at this time offered pt three other facility's taking medicaid pt was thankful with no additional questions or concerns.

## 2022-09-28 NOTE — TELEPHONE ENCOUNTER
----- Message from Ritchie Velasquez sent at 9/28/2022 10:20 AM CDT -----    Name of Who is Calling:  JANELLE KERR [[id number]]    What is the request in detail: Pt is requesting a call back for scheduling. She is a new pt with medicaid and a referral.       Can the clinic reply by [facility]: No      [facility]: [phone]

## 2024-05-08 ENCOUNTER — OFFICE VISIT (OUTPATIENT)
Dept: OBSTETRICS AND GYNECOLOGY | Facility: CLINIC | Age: 49
End: 2024-05-08
Payer: MEDICARE

## 2024-05-08 VITALS
HEIGHT: 66 IN | SYSTOLIC BLOOD PRESSURE: 122 MMHG | DIASTOLIC BLOOD PRESSURE: 86 MMHG | BODY MASS INDEX: 38.12 KG/M2 | WEIGHT: 237.19 LBS

## 2024-05-08 DIAGNOSIS — Z30.011 ENCOUNTER FOR INITIAL PRESCRIPTION OF CONTRACEPTIVE PILLS: ICD-10-CM

## 2024-05-08 DIAGNOSIS — R35.0 URINATION FREQUENCY: ICD-10-CM

## 2024-05-08 DIAGNOSIS — Z01.419 WELL WOMAN EXAM WITH ROUTINE GYNECOLOGICAL EXAM: Primary | ICD-10-CM

## 2024-05-08 LAB
BILIRUB SERPL-MCNC: ABNORMAL MG/DL
BLOOD URINE, POC: ABNORMAL
CLARITY, POC UA: CLEAR
COLOR, POC UA: YELLOW
GLUCOSE UR QL STRIP: 500
KETONES UR QL STRIP: ABNORMAL
LEUKOCYTE ESTERASE URINE, POC: ABNORMAL
NITRITE, POC UA: ABNORMAL
PH, POC UA: 8
PROTEIN, POC: ABNORMAL
SPECIFIC GRAVITY, POC UA: 1000
UROBILINOGEN, POC UA: ABNORMAL

## 2024-05-08 PROCEDURE — 4010F ACE/ARB THERAPY RXD/TAKEN: CPT | Mod: CPTII,S$GLB,, | Performed by: OBSTETRICS & GYNECOLOGY

## 2024-05-08 PROCEDURE — 3074F SYST BP LT 130 MM HG: CPT | Mod: CPTII,S$GLB,, | Performed by: OBSTETRICS & GYNECOLOGY

## 2024-05-08 PROCEDURE — 88175 CYTOPATH C/V AUTO FLUID REDO: CPT | Performed by: OBSTETRICS & GYNECOLOGY

## 2024-05-08 PROCEDURE — G0101 CA SCREEN;PELVIC/BREAST EXAM: HCPCS | Mod: S$GLB,,, | Performed by: OBSTETRICS & GYNECOLOGY

## 2024-05-08 PROCEDURE — 1159F MED LIST DOCD IN RCRD: CPT | Mod: CPTII,S$GLB,, | Performed by: OBSTETRICS & GYNECOLOGY

## 2024-05-08 PROCEDURE — 3044F HG A1C LEVEL LT 7.0%: CPT | Mod: CPTII,S$GLB,, | Performed by: OBSTETRICS & GYNECOLOGY

## 2024-05-08 PROCEDURE — 99213 OFFICE O/P EST LOW 20 MIN: CPT | Mod: PBBFAC | Performed by: OBSTETRICS & GYNECOLOGY

## 2024-05-08 PROCEDURE — 81002 URINALYSIS NONAUTO W/O SCOPE: CPT | Mod: PBBFAC | Performed by: OBSTETRICS & GYNECOLOGY

## 2024-05-08 PROCEDURE — 3079F DIAST BP 80-89 MM HG: CPT | Mod: CPTII,S$GLB,, | Performed by: OBSTETRICS & GYNECOLOGY

## 2024-05-08 PROCEDURE — 99999 PR PBB SHADOW E&M-EST. PATIENT-LVL III: CPT | Mod: PBBFAC,,, | Performed by: OBSTETRICS & GYNECOLOGY

## 2024-05-08 PROCEDURE — 87624 HPV HI-RISK TYP POOLED RSLT: CPT | Performed by: OBSTETRICS & GYNECOLOGY

## 2024-05-08 PROCEDURE — 3008F BODY MASS INDEX DOCD: CPT | Mod: CPTII,S$GLB,, | Performed by: OBSTETRICS & GYNECOLOGY

## 2024-05-08 RX ORDER — BLOOD-GLUCOSE,RECEIVER,CONT
EACH MISCELLANEOUS
COMMUNITY

## 2024-05-08 RX ORDER — NORETHINDRONE 0.35 MG/1
1 TABLET ORAL DAILY
Qty: 30 TABLET | Refills: 11 | Status: SHIPPED | OUTPATIENT
Start: 2024-05-08 | End: 2025-05-08

## 2024-05-08 NOTE — PROGRESS NOTES
History & Physical  Gynecology      SUBJECTIVE:     Chief Complaint: Well Woman       History of Present Illness:  Annual Exam-Premenopausal  Patient presents for annual exam. She has no complaints today. She does have hot flashes and night sweats.  Menstrual cycles are monthly- the first three days are heavy (requiring tampon and pad).  She is sexually active. GYN screening history: last pap: approximate date 2006 and was normal. She participates in regular exercise: yes.  Smoking status:  no.  MMG 3/2023.  Colonoscopy: 2023.    Contraception: would like contraception to stop period    FH:  Breast cancer: neg  Colon cancer: neg  Ovarian cancer: neg    Review of patient's allergies indicates:   Allergen Reactions    Iodinated contrast media Hives       Past Medical History:   Diagnosis Date    Abscess of left foot 11/11/2020    Arthritis     Bell's palsy 2001    Charcot's joint of foot     Diabetes mellitus     Diabetic foot infection     Eczema     HLD (hyperlipidemia)     Hyperpigmentation     Hypertension     Hypothyroid     Infection due to Enterobacteriaceae 11/25/2020    Macular edema     Multiple closed fractures of left foot     CAITLIN treated with BiPAP     Peripheral neuropathy     Pressure ulcer of left foot, stage 3     Pseudomonas aeruginosa infection 11/25/2020    Retinopathy      Past Surgical History:   Procedure Laterality Date    APPLICATION OF WOUND VACUUM-ASSISTED CLOSURE DEVICE Left 12/18/2020    Procedure: APPLICATION, WOUND VAC;  Surgeon: Irene Tripathi DPM;  Location: [facility] OR;  Service: Podiatry;  Laterality: Left;    CLOSURE OF WOUND Left 11/16/2020    Procedure: CLOSURE, WOUND - delayed primary closure;  Surgeon: Irene Tripathi DPM;  Location: [facility] OR;  Service: Podiatry;  Laterality: Left;    DEBRIDEMENT OF FOOT Left 12/18/2020    Procedure: DEBRIDEMENT, FOOT;  Surgeon: Irene Tripathi DPM;  Location: [facility] OR;  Service: Podiatry;  Laterality: Left;    DEBRIDEMENT OF FOOT Left 09/24/2021    DEBRIDEMENT  "OF FOOT Left 9/24/2021    Procedure: DEBRIDEMENT, FOOT;  Surgeon: Irene Tripathi DPM;  Location: [facility] OR;  Service: Podiatry;  Laterality: Left;    INCISION AND DRAINAGE FOOT Left 11/13/2020     OB History    No obstetric history on file.       No family history on file.  Social History     Tobacco Use    Smoking status: Some Days     Current packs/day: 0.25     Types: Cigarettes    Smokeless tobacco: Current   Substance Use Topics    Alcohol use: No    Drug use: No       Current Outpatient Medications   Medication Sig    aspirin (ECOTRIN) 81 MG EC tablet Take 81 mg by mouth once daily.    atorvastatin (LIPITOR) 20 MG tablet Take 20 mg by mouth.    BD ULTRA-FINE RENAY PEN NEEDLE 32 gauge x 5/32" Ndle USE 1 ONCE DAILY FOR 30 DAYS    calcium alginate-honey (MEDIHONEY, GRADY ALGINATE-HONEY,) 2 X 2 " Bndg Apply 1 Dose topically once daily.    dorzolamide (TRUSOPT) 2 % ophthalmic solution INSTILL 1 DROPS INTO EACH EYE TWICE DAILY    dulaglutide (TRULICITY) 0.75 mg/0.5 mL pen injector Inject into the skin every 7 days.    empagliflozin (JARDIANCE ORAL) Take 25 mg by mouth once daily.     fluticasone propionate (FLONASE) 50 mcg/actuation nasal spray fluticasone propionate 50 mcg/actuation nasal spray,suspension    LANTUS SOLOSTAR U-100 INSULIN glargine 100 units/mL (3mL) SubQ pen Inject 44 Units into the skin.    levETIRAcetam (KEPPRA) 500 MG Tab Take 500 mg by mouth 2 (two) times a day.    lisinopriL-hydrochlorothiazide (PRINZIDE,ZESTORETIC) 20-12.5 mg per tablet Take 2 tablets by mouth once daily.    topiramate (TOPAMAX) 50 MG tablet Take 50 mg by mouth 2 (two) times daily.    TRUE METRIX GLUCOSE TEST STRIP Strp USE 1 STRIP TO CHECK GLUCOSE THREE TIMES DAILY    TRUEPLUS LANCETS 33 gauge Misc USE 1 TO CHECK GLUCOSE THREE TIMES DAILY    DEXCOM G6  Misc by Misc.(Non-Drug; Combo Route) route.    ergocalciferol (ERGOCALCIFEROL) 50,000 unit Cap Take 50,000 Units by mouth once a week. (Patient not taking: Reported on " 5/8/2024)    EScitalopram oxalate (LEXAPRO) 5 MG Tab Take 1 tablet (5 mg total) by mouth once daily. Take 1 tablet, 5 mg, daily for 7 days.  Then take 2 tablets, 10 mg, daily.    gabapentin (NEURONTIN) 300 MG capsule Take 300 mg by mouth 3 (three) times daily. (Patient not taking: Reported on 5/8/2024)    HYDROcodone-acetaminophen (NORCO) 5-325 mg per tablet Take 1 tablet by mouth every 6 (six) hours as needed for Pain. (Patient not taking: Reported on 5/8/2024)    levothyroxine (SYNTHROID) 75 MCG tablet Take 75 mcg by mouth.    tramadol-acetaminophen 37.5-325 mg (ULTRACET) 37.5-325 mg Tab Take 1 tablet by mouth every 8 (eight) hours as needed for Pain. (Patient not taking: Reported on 5/8/2024)     No current facility-administered medications for this visit.         Review of Systems:  Review of Systems   Constitutional:  Negative for appetite change, fever and unexpected weight change.   Respiratory:  Negative for shortness of breath.    Cardiovascular:  Negative for chest pain.   Gastrointestinal:  Negative for nausea and vomiting.   Genitourinary:  Positive for menorrhagia. Negative for menstrual problem, pelvic pain, vaginal bleeding, vaginal discharge and vaginal pain.   Integumentary:  Negative for breast mass.   Breast: Negative for lump and mass       OBJECTIVE:     Physical Exam:  Physical Exam  Vitals and nursing note reviewed.   Constitutional:       Appearance: She is well-developed.   Cardiovascular:      Rate and Rhythm: Normal rate and regular rhythm.      Heart sounds: Normal heart sounds.   Pulmonary:      Effort: Pulmonary effort is normal.      Breath sounds: Normal breath sounds.   Chest:   Breasts:     Breasts are symmetrical.      Right: No inverted nipple, mass, nipple discharge, skin change or tenderness.      Left: No inverted nipple, mass, nipple discharge, skin change or tenderness.   Abdominal:      Palpations: Abdomen is soft.   Genitourinary:     General: Normal vulva.      Labia:          Right: No rash, tenderness, lesion or injury.         Left: No rash, tenderness, lesion or injury.       Urethra: No prolapse, urethral pain, urethral swelling or urethral lesion.      Vagina: Normal. No signs of injury and foreign body. No vaginal discharge, erythema, tenderness or bleeding.      Cervix: No cervical motion tenderness, discharge or friability.      Uterus: Not deviated, not enlarged, not fixed and not tender.       Adnexa:         Right: No mass, tenderness or fullness.          Left: No mass, tenderness or fullness.        Rectum: No anal fissure or external hemorrhoid.      Comments: Urethral meatus: normal size, anterior vaginal wall with no prolapse, no lesions  Bladder: no fullness, masses or tenderness  Musculoskeletal:      Cervical back: Normal range of motion.   Neurological:      Mental Status: She is alert and oriented to person, place, and time.   Psychiatric:         Behavior: Behavior normal.         Thought Content: Thought content normal.         Judgment: Judgment normal.         Chaperoned by: Yoana    ASSESSMENT:       ICD-10-CM ICD-9-CM    1. Well woman exam with routine gynecological exam  Z01.419 V72.31 Liquid-Based Pap Smear, Screening      HPV High Risk Genotypes, PCR      2. Urination frequency  R35.0 788.41 POCT URINE DIPSTICK WITHOUT MICROSCOPE             Plan:      Rebecca was seen today for well woman.    Diagnoses and all orders for this visit:    Well woman exam with routine gynecological exam  -     Liquid-Based Pap Smear, Screening  -     HPV High Risk Genotypes, PCR    Urination frequency  -     POCT URINE DIPSTICK WITHOUT MICROSCOPE        Orders Placed This Encounter   Procedures    HPV High Risk Genotypes, PCR    POCT URINE DIPSTICK WITHOUT MICROSCOPE       Well Woman:  - Pap smear and hpv today  - Birth control: rx for POP; discussed pregnancy and recommended against pregnancy given medical problems, age, and risks to her health; patient voiced  understanding; desires to stop periods but is having hot flashes   - GC/CT:n/a  - Mammogram: up to date  - Smoking cessation: n/a  - Labs: none required   - Vaccines: none requried  - Exercise counseling      Follow up in one year for annual or prn.    Tete Londono

## 2024-05-14 LAB
HPV HR 12 DNA SPEC QL NAA+PROBE: NEGATIVE
HPV16 AG SPEC QL: NEGATIVE
HPV18 DNA SPEC QL NAA+PROBE: NEGATIVE

## 2024-05-16 LAB
FINAL PATHOLOGIC DIAGNOSIS: NORMAL
Lab: NORMAL

## 2025-04-30 ENCOUNTER — TELEPHONE (OUTPATIENT)
Dept: OBSTETRICS AND GYNECOLOGY | Facility: CLINIC | Age: 50
End: 2025-04-30
Payer: COMMERCIAL

## 2025-04-30 NOTE — TELEPHONE ENCOUNTER
----- Message from Ani sent at 4/30/2025 10:37 AM CDT -----  Regarding: appt request  Name of Who is Calling: Rebecca What is the request in detail: Patient is requesting a call back to schedule her WWE appointment. She has [facility] primary and Medicaid secondary as insurance.  Can the clinic reply by [facility]: No What Number to Call Back if not in [facility]: [phone]

## 2025-05-14 ENCOUNTER — OFFICE VISIT (OUTPATIENT)
Dept: PLASTIC SURGERY | Facility: CLINIC | Age: 50
End: 2025-05-14
Payer: COMMERCIAL

## 2025-05-14 VITALS
HEIGHT: 66 IN | BODY MASS INDEX: 38.12 KG/M2 | WEIGHT: 237.19 LBS | SYSTOLIC BLOOD PRESSURE: 137 MMHG | HEART RATE: 71 BPM | DIASTOLIC BLOOD PRESSURE: 71 MMHG

## 2025-05-14 DIAGNOSIS — N39.3 STRESS INCONTINENCE: ICD-10-CM

## 2025-05-14 DIAGNOSIS — L30.4 ERYTHEMA INTERTRIGO: Primary | ICD-10-CM

## 2025-05-14 DIAGNOSIS — E65 PANNUS, ABDOMINAL: ICD-10-CM

## 2025-05-14 DIAGNOSIS — M54.50 CHRONIC BILATERAL LOW BACK PAIN WITHOUT SCIATICA: ICD-10-CM

## 2025-05-14 DIAGNOSIS — G89.29 CHRONIC BILATERAL LOW BACK PAIN WITHOUT SCIATICA: ICD-10-CM

## 2025-05-14 DIAGNOSIS — L98.7 EXCESSIVE AND REDUNDANT SKIN AND SUBCUTANEOUS TISSUE: ICD-10-CM

## 2025-05-14 PROCEDURE — 1159F MED LIST DOCD IN RCRD: CPT | Mod: CPTII,S$GLB,, | Performed by: SURGERY

## 2025-05-14 PROCEDURE — 3078F DIAST BP <80 MM HG: CPT | Mod: CPTII,S$GLB,, | Performed by: SURGERY

## 2025-05-14 PROCEDURE — 4010F ACE/ARB THERAPY RXD/TAKEN: CPT | Mod: CPTII,S$GLB,, | Performed by: SURGERY

## 2025-05-14 PROCEDURE — 3075F SYST BP GE 130 - 139MM HG: CPT | Mod: CPTII,S$GLB,, | Performed by: SURGERY

## 2025-05-14 PROCEDURE — 3008F BODY MASS INDEX DOCD: CPT | Mod: CPTII,S$GLB,, | Performed by: SURGERY

## 2025-05-14 PROCEDURE — 3052F HG A1C>EQUAL 8.0%<EQUAL 9.0%: CPT | Mod: CPTII,S$GLB,, | Performed by: SURGERY

## 2025-05-14 PROCEDURE — 99204 OFFICE O/P NEW MOD 45 MIN: CPT | Mod: S$GLB,,, | Performed by: SURGERY

## 2025-05-14 PROCEDURE — 99999 PR PBB SHADOW E&M-EST. PATIENT-LVL IV: CPT | Mod: PBBFAC,,, | Performed by: SURGERY

## 2025-05-14 NOTE — PROGRESS NOTES
Presents to Plastic surgery Clinic with a chief complaint of the large heavy abdominal wall pannus.  Patient states that she has lost over 50 lb on her own through dieting and exercise.  Patient states that she developed a large amount of hanging SS skin.  Patient states that because of the weight of the pannus it is causing her severe lower back pain.  Patient does take ibuprofen other nonsteroidal anti-inflammatory medications for this.  She has and is currently being treated with injections by pain management for the lower back pain.  Pain management was the referring department believing that it is the weight of the pannus that is causing the pain.  Patient also complains of intermittent incontinence secondary to the pannus pressing her bladder.  Patient's past medical history significant for diabetes mellitus as well as hypertension.  Patient has had a below-the-knee amputation secondary to a foot infection.  Physical examination shows a healthy-appearing female.  Patient is awake alert and oriented.  Her abdominal pannus shows a double bubble large pannus.  I discussed with the patient in detail that we can remove the lower abdominal pannus only.  I stressed to her that she would not be thin in any way shape or form.  Her belly would be round.  She understands fully that there would be no liposuction no abdominal wall plication and she would not be thin.  I can not stressed this enough.  I can not take the upper abdominal pannus off only the lower.  We will be removing her belly button.  We will go ahead and seek approval from insurance.

## 2025-05-27 ENCOUNTER — OFFICE VISIT (OUTPATIENT)
Dept: OBSTETRICS AND GYNECOLOGY | Facility: CLINIC | Age: 50
End: 2025-05-27
Payer: COMMERCIAL

## 2025-05-27 VITALS
BODY MASS INDEX: 38.79 KG/M2 | WEIGHT: 240.31 LBS | SYSTOLIC BLOOD PRESSURE: 122 MMHG | DIASTOLIC BLOOD PRESSURE: 80 MMHG

## 2025-05-27 DIAGNOSIS — N84.1 CERVICAL POLYP: ICD-10-CM

## 2025-05-27 DIAGNOSIS — Z01.419 WELL WOMAN EXAM WITH ROUTINE GYNECOLOGICAL EXAM: Primary | ICD-10-CM

## 2025-05-27 PROCEDURE — 88305 TISSUE EXAM BY PATHOLOGIST: CPT | Mod: TC | Performed by: OBSTETRICS & GYNECOLOGY

## 2025-05-27 PROCEDURE — 99999 PR PBB SHADOW E&M-EST. PATIENT-LVL III: CPT | Mod: PBBFAC,,, | Performed by: OBSTETRICS & GYNECOLOGY

## 2025-05-27 RX ORDER — LISINOPRIL 10 MG/1
TABLET ORAL
COMMUNITY

## 2025-05-27 NOTE — PROGRESS NOTES
History & Physical  Gynecology      SUBJECTIVE:     Chief Complaint: Well Woman       History of Present Illness:  Annual Exam-Premenopausal  Patient presents for annual exam.  She is sexually active, not using contraception. GYN screening history: last pap: approximate 2024 paphpv and was normal. She participates in regular exercise: yes.  Smoking status:  no.  MMG 3/2025.  Colonoscopy: 2023.    AUB:  She has complaints today. She reports that her cycles are irregular.  One year ago, we put in her on POP for contraception and to stop her period. But she didn't continue this beyond one month.  After this, she skipped her period for 3-4 months.  Then, she started to have irregular spotting, but nothing monthly.  In the past few months, she reports having very light spotting at the beginning of the month, then at the end of the month and never enough to fill a pad or soil her underwear.  She does report that the first day will be a small amount of clots, but then it is gone.  She reports in her past that she has always been irregular.  Reports one time, she went a full year without a cycle.     FH:  Breast cancer: neg  Colon cancer: neg  Ovarian cancer: neg    Review of patient's allergies indicates:   Allergen Reactions    Iodinated contrast media Hives       Past Medical History:   Diagnosis Date    Abscess of left foot 11/11/2020    Arthritis     Bell's palsy 2001    Charcot's joint of foot     Diabetes mellitus     Diabetic foot infection     Eczema     HLD (hyperlipidemia)     Hyperpigmentation     Hypertension     Hypothyroid     Infection due to Enterobacteriaceae 11/25/2020    Macular edema     Multiple closed fractures of left foot     CAITLIN treated with BiPAP     Peripheral neuropathy     Pressure ulcer of left foot, stage 3     Pseudomonas aeruginosa infection 11/25/2020    Retinopathy      Past Surgical History:   Procedure Laterality Date    APPLICATION OF WOUND VACUUM-ASSISTED CLOSURE DEVICE Left  "12/18/2020    Procedure: APPLICATION, WOUND VAC;  Surgeon: Irene Tripathi DPM;  Location: [facility] OR;  Service: Podiatry;  Laterality: Left;    CLOSURE OF WOUND Left 11/16/2020    Procedure: CLOSURE, WOUND - delayed primary closure;  Surgeon: Irene Tripathi DPM;  Location: [facility] OR;  Service: Podiatry;  Laterality: Left;    DEBRIDEMENT OF FOOT Left 12/18/2020    Procedure: DEBRIDEMENT, FOOT;  Surgeon: Irene Tripathi DPM;  Location: [facility] OR;  Service: Podiatry;  Laterality: Left;    DEBRIDEMENT OF FOOT Left 09/24/2021    DEBRIDEMENT OF FOOT Left 9/24/2021    Procedure: DEBRIDEMENT, FOOT;  Surgeon: Irene Tripathi DPM;  Location: [facility] OR;  Service: Podiatry;  Laterality: Left;    INCISION AND DRAINAGE FOOT Left 11/13/2020     OB History    No obstetric history on file.       No family history on file.  Social History     Tobacco Use    Smoking status: Former     Current packs/day: 0.25     Types: Cigarettes    Smokeless tobacco: Current    Tobacco comments:     About 2 years now since Patient stop smoking   Substance Use Topics    Alcohol use: No    Drug use: No       Current Outpatient Medications   Medication Sig    aspirin (ECOTRIN) 81 MG EC tablet Take 81 mg by mouth once daily.    atorvastatin (LIPITOR) 20 MG tablet Take 20 mg by mouth.    BD ULTRA-FINE RENAY PEN NEEDLE 32 gauge x 5/32" Ndle USE 1 ONCE DAILY FOR 30 DAYS    DEXCOM G6  Misc by Misc.(Non-Drug; Combo Route) route.    dorzolamide (TRUSOPT) 2 % ophthalmic solution INSTILL 1 DROPS INTO EACH EYE TWICE DAILY    dulaglutide (TRULICITY) 0.75 mg/0.5 mL pen injector Inject into the skin every 7 days.    empagliflozin (JARDIANCE ORAL) Take 25 mg by mouth once daily.     ergocalciferol (ERGOCALCIFEROL) 50,000 unit Cap Take 50,000 Units by mouth once a week.    EScitalopram oxalate (LEXAPRO) 5 MG Tab Take 1 tablet (5 mg total) by mouth once daily. Take 1 tablet, 5 mg, daily for 7 days.  Then take 2 tablets, 10 mg, daily.    fluticasone propionate (FLONASE) 50 " "mcg/actuation nasal spray fluticasone propionate 50 mcg/actuation nasal spray,suspension    LANTUS SOLOSTAR U-100 INSULIN glargine 100 units/mL (3mL) SubQ pen Inject 44 Units into the skin.    levETIRAcetam (KEPPRA) 500 MG Tab Take 500 mg by mouth 2 (two) times a day.    levothyroxine (SYNTHROID) 75 MCG tablet Take 75 mcg by mouth.    topiramate (TOPAMAX) 50 MG tablet Take 50 mg by mouth 2 (two) times daily.    TRUE METRIX GLUCOSE TEST STRIP Strp USE 1 STRIP TO CHECK GLUCOSE THREE TIMES DAILY    TRUEPLUS LANCETS 33 gauge Misc USE 1 TO CHECK GLUCOSE THREE TIMES DAILY    calcium alginate-honey (MEDIHONEY, GRADY ALGINATE-HONEY,) 2 X 2 " Bndg Apply 1 Dose topically once daily.    lisinopriL 10 MG tablet 1 tablet Orally Once a day for 30 day(s)     No current facility-administered medications for this visit.         Review of Systems:  Review of Systems   Constitutional:  Negative for appetite change, fever and unexpected weight change.   Respiratory:  Negative for shortness of breath.    Cardiovascular:  Negative for chest pain.   Gastrointestinal:  Negative for nausea and vomiting.   Genitourinary:  Negative for menorrhagia, menstrual problem, pelvic pain, vaginal bleeding, vaginal discharge and vaginal pain.   Integumentary:  Negative for breast mass.   Breast: Negative for lump and mass       OBJECTIVE:     Physical Exam:  Physical Exam  Vitals and nursing note reviewed.   Constitutional:       Appearance: She is well-developed.   Cardiovascular:      Rate and Rhythm: Normal rate and regular rhythm.      Heart sounds: Normal heart sounds.   Pulmonary:      Effort: Pulmonary effort is normal.      Breath sounds: Normal breath sounds.   Chest:   Breasts:     Breasts are symmetrical.      Right: No inverted nipple, mass, nipple discharge, skin change or tenderness.      Left: No inverted nipple, mass, nipple discharge, skin change or tenderness.   Abdominal:      Palpations: Abdomen is soft.   Genitourinary:     General: " Normal vulva.      Labia:         Right: No rash, tenderness, lesion or injury.         Left: No rash, tenderness, lesion or injury.       Urethra: No prolapse, urethral pain, urethral swelling or urethral lesion.      Vagina: Normal. No signs of injury and foreign body. No vaginal discharge, erythema, tenderness or bleeding.      Cervix: No cervical motion tenderness, discharge or friability.      Uterus: Not deviated, not enlarged, not fixed and not tender.       Adnexa:         Right: No mass, tenderness or fullness.          Left: No mass, tenderness or fullness.        Rectum: No anal fissure or external hemorrhoid.            Comments: Urethral meatus: normal size, anterior vaginal wall with no prolapse, no lesions  Bladder: no fullness, masses or tenderness  Musculoskeletal:      Cervical back: Normal range of motion.   Neurological:      Mental Status: She is alert and oriented to person, place, and time.   Psychiatric:         Behavior: Behavior normal.         Thought Content: Thought content normal.         Judgment: Judgment normal.         Chaperoned by: Jodi    ASSESSMENT:       ICD-10-CM ICD-9-CM    1. Well woman exam with routine gynecological exam  Z01.419 V72.31       2. Cervical polyp  N84.1 622.7 Specimen to Pathology Gynecology and Obstetrics               Plan:      Rebecca was seen today for well woman.    Diagnoses and all orders for this visit:    Well woman exam with routine gynecological exam    Cervical polyp  -     Specimen to Pathology Gynecology and Obstetrics          No orders of the defined types were placed in this encounter.      Well Woman:  - Pap smear up to date  - Birth control: condoms  - GC/CT:n/a  - Mammogram: up to date  - Smoking cessation: n/a  - Labs: none required   - Vaccines: none requried  - Exercise counseling    AUB:   - suspect this was related to the patient's cervical polyp (removed in clinic)  - will wait on the uterine biopsy for now; if patient's irregular  bleeding is persistent will pursue a uterine biopsy  - patient advised to keep a menstrual calendar      Follow up in one year for annual or prn.    Tete Londono

## 2025-05-29 ENCOUNTER — RESULTS FOLLOW-UP (OUTPATIENT)
Dept: OBSTETRICS AND GYNECOLOGY | Facility: CLINIC | Age: 50
End: 2025-05-29

## 2025-05-29 LAB
ESTROGEN SERPL-MCNC: NORMAL PG/ML
INSULIN SERPL-ACNC: NORMAL U[IU]/ML
LAB AP CLINICAL INFORMATION: NORMAL
LAB AP GROSS DESCRIPTION: NORMAL
LAB AP PERFORMING LOCATION(S): NORMAL
LAB AP REPORT FOOTNOTES: NORMAL

## 2025-07-19 DIAGNOSIS — Z30.011 ENCOUNTER FOR INITIAL PRESCRIPTION OF CONTRACEPTIVE PILLS: ICD-10-CM

## 2025-07-19 RX ORDER — NORETHINDRONE 0.35 MG/1
TABLET ORAL
Qty: 28 TABLET | OUTPATIENT
Start: 2025-07-19

## 2025-07-19 NOTE — TELEPHONE ENCOUNTER
Refill Decision Note  Fozia EVANGELISTA. Inappropriate Request     Rebecca Olvera  is requesting a refill authorization.  Brief Assessment and Rationale for Refill:  Quick Discontinue     Medication Therapy Plan:  prescription was discontinued on 5/27/2025 by Dariana Street MA for the following reason: Patient no longer taking.    Medication Reconciliation Completed: No   Comments:           Note composed:8:22 AM 07/19/2025